# Patient Record
Sex: MALE | Race: OTHER | HISPANIC OR LATINO | Employment: FULL TIME | URBAN - METROPOLITAN AREA
[De-identification: names, ages, dates, MRNs, and addresses within clinical notes are randomized per-mention and may not be internally consistent; named-entity substitution may affect disease eponyms.]

---

## 2023-09-29 ENCOUNTER — HOSPITAL ENCOUNTER (OUTPATIENT)
Facility: CLINIC | Age: 38
Setting detail: OUTPATIENT SURGERY
End: 2023-09-29
Attending: STUDENT IN AN ORGANIZED HEALTH CARE EDUCATION/TRAINING PROGRAM | Admitting: STUDENT IN AN ORGANIZED HEALTH CARE EDUCATION/TRAINING PROGRAM
Payer: COMMERCIAL

## 2023-10-03 PROBLEM — H90.41 SENSORINEURAL HEARING LOSS (SNHL) OF RIGHT EAR WITH UNRESTRICTED HEARING OF LEFT EAR: Status: ACTIVE | Noted: 2023-10-03

## 2023-10-03 PROBLEM — L02.411 ABSCESS OF AXILLA, RIGHT: Status: ACTIVE | Noted: 2023-10-03

## 2023-10-03 RX ORDER — METHIMAZOLE 10 MG/1
10 TABLET ORAL
COMMUNITY

## 2023-10-03 RX ORDER — OMEPRAZOLE 20 MG/1
1 CAPSULE, DELAYED RELEASE ORAL
COMMUNITY
End: 2024-05-28 | Stop reason: SDUPTHER

## 2023-10-04 ENCOUNTER — CLINICAL SUPPORT (OUTPATIENT)
Dept: AUDIOLOGY | Facility: CLINIC | Age: 38
End: 2023-10-04
Payer: COMMERCIAL

## 2023-10-04 DIAGNOSIS — H90.41 SENSORINEURAL HEARING LOSS (SNHL) OF RIGHT EAR WITH UNRESTRICTED HEARING OF LEFT EAR: Primary | ICD-10-CM

## 2023-10-04 RX ORDER — CEFAZOLIN SODIUM 2 G/100ML
2 INJECTION, SOLUTION INTRAVENOUS ONCE
Status: CANCELLED | OUTPATIENT
Start: 2023-10-04 | End: 2023-10-04

## 2023-10-04 RX ORDER — SODIUM CHLORIDE, SODIUM LACTATE, POTASSIUM CHLORIDE, CALCIUM CHLORIDE 600; 310; 30; 20 MG/100ML; MG/100ML; MG/100ML; MG/100ML
100 INJECTION, SOLUTION INTRAVENOUS CONTINUOUS
Status: CANCELLED | OUTPATIENT
Start: 2023-10-04

## 2023-10-04 NOTE — PROGRESS NOTES
Device Selection:    Vaibhav, 38 years old, was seen for a device selection at the referral of Dr. Preston. He had a sudden sensorineural hearing loss in the right ear in May 2023. He was seen by an outside ENT in June 2023 and put on a steroid pack and intra-tympanic membrane injections. His hearing did not resolve.     He is a cochlear implant candidate in the right ear.    Internal Device: /[id number]  Surgeon: Remigio Preston  Surgery Date: 1/17/2024  Processor:  Kanso 2 in black  Audiologist: Yamel Perea  Activation Date: TBD  Ear: Right    Accessories: mini, microphone, TV streamer, activity kit, plus one    Hazel Schafer CCC-A  Licensed Audiologist

## 2024-05-28 ENCOUNTER — OFFICE VISIT (OUTPATIENT)
Dept: OTOLARYNGOLOGY | Facility: CLINIC | Age: 39
End: 2024-05-28
Payer: COMMERCIAL

## 2024-05-28 ENCOUNTER — HOSPITAL ENCOUNTER (OUTPATIENT)
Facility: CLINIC | Age: 39
Setting detail: OUTPATIENT SURGERY
End: 2024-05-28
Attending: STUDENT IN AN ORGANIZED HEALTH CARE EDUCATION/TRAINING PROGRAM | Admitting: STUDENT IN AN ORGANIZED HEALTH CARE EDUCATION/TRAINING PROGRAM
Payer: COMMERCIAL

## 2024-05-28 VITALS — HEIGHT: 67 IN | BODY MASS INDEX: 21.97 KG/M2 | WEIGHT: 140 LBS

## 2024-05-28 DIAGNOSIS — Z01.818 PRE-OP TESTING: ICD-10-CM

## 2024-05-28 DIAGNOSIS — Z23 NEED FOR VARICELLA VACCINE: ICD-10-CM

## 2024-05-28 DIAGNOSIS — Z23 NEED FOR MMR VACCINE: ICD-10-CM

## 2024-05-28 DIAGNOSIS — Z92.29: ICD-10-CM

## 2024-05-28 DIAGNOSIS — H90.41 SENSORINEURAL HEARING LOSS (SNHL) OF RIGHT EAR WITH UNRESTRICTED HEARING OF LEFT EAR: ICD-10-CM

## 2024-05-28 PROBLEM — I48.92 ATRIAL FLUTTER (MULTI): Status: ACTIVE | Noted: 2019-05-20

## 2024-05-28 PROBLEM — N40.1 BPH WITH OBSTRUCTION/LOWER URINARY TRACT SYMPTOMS: Status: ACTIVE | Noted: 2024-03-05

## 2024-05-28 PROBLEM — E05.90 THYROTOXICOSIS: Status: ACTIVE | Noted: 2019-05-20

## 2024-05-28 PROBLEM — R00.0 TACHYCARDIA: Status: ACTIVE | Noted: 2019-05-20

## 2024-05-28 PROBLEM — R35.1 NOCTURIA: Status: ACTIVE | Noted: 2024-03-05

## 2024-05-28 PROBLEM — E05.90 HYPERTHYROIDISM: Status: ACTIVE | Noted: 2019-05-20

## 2024-05-28 PROBLEM — N40.0 BPH WITHOUT URINARY OBSTRUCTION: Status: ACTIVE | Noted: 2024-04-05

## 2024-05-28 PROBLEM — R74.8 ELEVATED ALKALINE PHOSPHATASE LEVEL: Status: ACTIVE | Noted: 2019-04-09

## 2024-05-28 PROBLEM — R35.0 FREQUENCY OF MICTURITION: Status: ACTIVE | Noted: 2024-03-05

## 2024-05-28 PROBLEM — R73.9 HYPERGLYCEMIA: Status: ACTIVE | Noted: 2019-04-09

## 2024-05-28 PROBLEM — N13.8 BPH WITH OBSTRUCTION/LOWER URINARY TRACT SYMPTOMS: Status: ACTIVE | Noted: 2024-03-05

## 2024-05-28 PROBLEM — R39.11 HESITANCY: Status: ACTIVE | Noted: 2024-04-05

## 2024-05-28 PROCEDURE — 1036F TOBACCO NON-USER: CPT | Performed by: STUDENT IN AN ORGANIZED HEALTH CARE EDUCATION/TRAINING PROGRAM

## 2024-05-28 PROCEDURE — 99213 OFFICE O/P EST LOW 20 MIN: CPT | Performed by: STUDENT IN AN ORGANIZED HEALTH CARE EDUCATION/TRAINING PROGRAM

## 2024-05-28 RX ORDER — OMEPRAZOLE 20 MG/1
CAPSULE, DELAYED RELEASE ORAL
Qty: 16 CAPSULE | Refills: 0 | Status: SHIPPED | OUTPATIENT
Start: 2024-06-16

## 2024-05-28 RX ORDER — PREDNISONE 10 MG/1
TABLET ORAL DAILY
Qty: 66 TABLET | Refills: 0 | Status: SHIPPED | OUTPATIENT
Start: 2024-06-16 | End: 2024-07-02

## 2024-05-28 RX ORDER — ACETAMINOPHEN 500 MG
2000 TABLET ORAL DAILY
COMMUNITY

## 2024-05-28 NOTE — PROGRESS NOTES
"Chief Complaint     Right sided hearing loss      History of Present IllnessThis visit was conducted over the phone, and as such, physical exam was deferred. I was unable to complete the visit virtually due to connection issues, though I was able to briefly see him over the video, and he appeared to be in a similar physical state as his previous in person visit.     History of present illness from 8/17/2023:  Mr. Caban is a 38-year-old man who developed sudden hearing loss and vertigo in late May. He underwent oral steroid treatment as well as three intratympanic steroid injections with Clytemnestra Fedak. His balance is significantly better, but he continues to have significant difficulties with his hearing. He reports that he also has significant tinnitus on the right side. He also had some small amount of improvement in the right side, but continues to have significant hearing loss in that right side. He denies previous ear tubes or ear surgeries.     Interval History from 9/21/2023: He underwent cochlear implant evaluation and was found to be a candidate for right-sided cochlear implant. He is interested in right-sided cochlear implantation.     Interval History: He was previously set up for cochlear implantation on the right side, but cancelled his surgery as he wanted more time to consider.  He has considered his options, and would like to proceed with cochlear implantation.      PHYSICAL EXAM:    VITALS:   Vitals:    05/28/24 0821   Weight: 63.5 kg (140 lb)   Height: 1.702 m (5' 7\")        GENERAL: healthy, alert, well developed, well nourished, no distress, cooperative, appears chronologic age    Communicates with normal voice and without hearing aids.    HEAD: atraumatic, normocephalic, no lesions    EYES: normal, PERRLA and EOM's intact    EARS:   Right ear demonstrates a patent external auditory canal with a normal tympanic membrane.    Left ear: demonstrates a patent external auditory canal with a " normal tympanic membrane.   Alvarado tuning fork test lateralizes midline 512 Hz.   Rinne testing with a 512 Hz tuning fork: Right Air conduction > Bone conduction   Left Air conduction > Bone conduction      NOSE: nose shows no deformity, asymmetry, or inflammation, nasal mucosa normal,     ORAL CAVITY/OROPHARYNX: negative findings: lips normal without lesions, buccal mucosa normal, gums healthy, teeth intact, non-carious, palate normal, tongue midline and normal, soft palate, uvula, and tonsils normal    NECK AND SALIVARY GLANDS: Neck is supple without masses or lymphadenopathy. Palpation of the parotid and submandibular gland reveals no masses or tenderness to palpation.    CRANIAL NERVES: intact,   Facial nerve exam  is House - Brackmann 1- Normal Function on the right and 1- Normal Function on the left.    CARDIOVASCULAR: No evidence of peripheral edema    PULMONARY: normal lung excursion, no evidence of retractions    Diagnostic testing:  I reviewed the audiogram from 6/14/2023, which demonstrates left mild low-frequency hearing loss rising to normal hearing and right moderate rising to normal sensorineural hearing loss. Word recognition score was 96% on the left, and 8% on the right.     I reviewed the audiogram from 7/10/2023, which demonstrates normal left hearing and right severe rising to normal sensorineural hearing loss. Word recognition score on the left was 100% and 20% on the right.     I was able to review the images and the report from his MRI brain with and without contrast. There is no evidence of retrocochlear pathology.     I reviewed the cochlear implant evaluation, which demonstrated the following:  Right Ear:   CNC in quiet: -- 40%  AzBio in quiet: --56%  AzBio +10 SNR: -- 55%     Impression:  Right sided sensorineural hearing loss from a sudden cause. Likely caused by viral labyrinthitis.     Recommendation:  I discussed the patient's hearing loss. I discussed that his hearing in the right  side is likely not serviceable based on a word recognition score of 20%. I discussed options for hearing rehabilitation, which include CROS hearing aids, bone-conduction devices, and cochlear implantation. I discussed the risk, benefits of cochlear implantation, which include benefits of sound localization, hearing and noise, and tinnitus reduction with risks of poor performance, loss of natural hearing, dizziness, change in taste, facial weakness, tympanic membrane perforation, cerebrospinal fluid leak, implant infection, implant malfunction, implant extrusion, meningitis, need for further procedures. I also discussed vaccination. He also needs prevnar 20. He would like to proceed with cochlear implantation. I recommend a prednisone taper with omeprazole for possible hearing preservation. I would also like to have ECOG at the time of surgery. I recommend cochlear brand 632 with 622 backup electrode.  The patient is interested in proceeding.       This note was created using speech recognition transcription software. Despite proofreading, several typographical errors might be present that might affect the meaning of the content. Please call with any questions.

## 2024-06-03 NOTE — PREPROCEDURE INSTRUCTIONS
Pre-Op Instructions & Checklist   Your surgery has been scheduled at [facility] at [address] [place], [address], [address], in the [facility]. Parking is to the left of the main entrance.  You will be contacted about the time of your surgery the day before your surgery. If you are unable to answer the phone, a detailed voicemail message will be left. Make sure that your voicemail box is not full so a message can be left. If you have not received a call by 3:00 pm you may call [phone] between the hours of 3:00 and 4:00 pm. Please be available by phone the night before/day of surgery in case there is a change in the schedule which may require you to arrive earlier/later.    14 DAYS BEFORE SURGERY STOP TAKING WEIGHT LOSS MEDICATIONS      7 DAYS BEFORE SURGERY STOP THESE MEDICATIONS:  Multiple Vitamins containing Vitamin E  Herbal supplements, Fish Oil, garlic pills, turmeric, CoQ enzyme  Stop taking aspirin, and aspirin-containing products as well as NSAID's such as Advil, Motrin, Aleve, Ibuprofen. Tylenol is okay to take for pain relief.   If you are currently taking Coumadin/Warfarin, we will have to coordinate that with your PCP &/or the Anticoagulation Clinic.    THE DAY BEFORE SURGERY:  Do not eat any food after midnight the night before surgery.   You are permitted to have clear liquids such as water, apple juice, plain tea or coffee (no milk or creamer), clear electrolyte-replenishing drinks such as Pedialyte, Gatorade, or Powerade (not yogurt or pulp-containing smoothies or juices such as orange juice) up to 2 hours before your surgery.    DAY OF SURGERY, TAKE THESE MEDICATIONS with a small sip of water (if it is not listed, do not take it):    Take: Methimazole; prednisone; omeprazole                  ON THE MORNING OF SURGERY:  *Shower either the night before your surgery or the morning of your surgery  *Do not use moisturizers, creams, lotions or perfume, or  make-up.  *Wear comfortable, loose fitting clothing.   *All jewelry and valuables should be left at home.  *Prosthetic devices such as contact lenses, hearing aids, dentures, eyelash extensions, hairpins and body piercings must be removed before surgery. Bring containers for eyeglasses/contacts, dentures, or hearing aids with you.  Diabetics: Please check fasting blood sugars upon waking up.  If fasting blood sugars are <80ml/dl, please drink 100ml/3oz. of apple juice no later than 2 hours prior to surgery.      BRING WITH YOU:   *Photo ID and insurance card  *Current list of medicines and allergies  *Pacemaker/Defibrillator/Heart stent cards  *Copy of your complete Advanced Directive/DHPOA-if applicable     SMOKING:  *Quitting smoking can make a huge difference to your health and recovery from surgery.    *If you need help with quitting, call 4-055-QUIT-NOW.  Alcohol:  *No alcoholic beverages for 48 hours before surgery.     AFTER OUTPATIENT SURGERY:  *A responsible adult MUST accompany you at the time of discharge and stay with you for 24 hours after your surgery.  *You may NOT drive yourself home after surgery.  *You may use a taxi or ride sharing service ([employer], Uber) to return home ONLY if you are to change the date accompanied by a friend or family member.  *Instructions for resuming your medications will be provided by your surgeon.     CONTACT SURGEON'S OFFICE IF YOU DEVELOP:  * Fever =/> 100.4 F   * New respiratory symptoms (e.g. cough, shortness of breath, respiratory distress, sore throat)  * Recent loss of taste or smell  *Flu like symptoms such as headache, fatigue or gastrointestinal symptoms  * If you develop any open sores, shingles, burning or painful urination   AND/OR:  * You no longer wish to have the surgery.  * Any other personal circumstances change that may lead to the need to cancel or defer this surgery.  *You were admitted to any hospital within one week of your planned procedure.     If  you have any questions regarding these preoperative instructions you may call [phone]. If you have questions regarding you surgical procedure, or post-operative care/recovery please call your surgeon's office.

## 2024-06-03 NOTE — CPM/PAT H&P
CPM/PAT Evaluation       Name: Vaibhav Caban (Vaibhav Caban)  /Age: 1985/38 y.o.     TELEMEDICINE ENCOUNTER  Patient was contacted by telephone for preadmission testing perioperative risk assessment prior to surgery.    CHIEF COMPLAINT  Sensorineural hearing loss, right ear    HPI  Owen Bañuelos is a 38-year-old male experienced sudden hearing loss and vertigo in May 2023.  He was treated with oral steroids as well as 3 intratympanic steroid injections.  He states that the vertigo/balance has improved, but he continues to have significant right-sided hearing loss.  He also is complaining of tinnitus on the right side.  He denies any exposure to ototoxic drugs, prior ear surgery or ear tube placement.  He is interested in cochlear implant, and underwent cochlear implant evaluation and was found to be a candidate.  He is scheduled for right-sided cochlear implantation on 2024.    ACTIVE PROBLEMS  Patient Active Problem List   Diagnosis    Abscess of axilla, right    Sensorineural hearing loss (SNHL) of right ear with unrestricted hearing of left ear    Atrial flutter (Multi)    BPH with obstruction/lower urinary tract symptoms    BPH without urinary obstruction    Elevated alkaline phosphatase level    Frequency of micturition    Hesitancy    Hyperglycemia    Nocturia    Tachycardia    Hyperthyroidism    Thyrotoxicosis     PAST MEDICAL HISTORY  Past Medical History:   Diagnosis Date    Disease of thyroid gland On 2020    Tinnitus May 2023     SURGICAL HISTORY  Past Surgical History:   Procedure Laterality Date    BUNIONECTOMY      COLONOSCOPY      FOOT SURGERY       ANESTHESIA HISTORY  Denies problems with anesthesia in the past such as PONV, prolonged sedation, awareness, dental damage, aspiration, cardiac arrest, difficult intubation, or unexpected hospital admissions.  Denies family history of malignant hyperthermia, or pseudocholinesterase deficiency.    SOCIAL HISTORY  Never smoker; EtOH:  Occasional use; denies recreational drug use.  Patient states he exercises 5-6 times a week and is able to do moderate ADLs such as heavy housework, light yard work.  He denies chest pain, CORONA.  METS 4    FAMILY HISTORY  Family History   Family history unknown: Yes     ALLERGIES  Not on File    MEDICATIONS  No current facility-administered medications for this encounter.    Current Outpatient Medications:     cholecalciferol (Vitamin D-3) 50 mcg (2,000 unit) capsule, Take 1 capsule (50 mcg) by mouth early in the morning.., Disp: , Rfl:     methIMAzole (Tapazole) 10 mg tablet, Take 1 tablet (10 mg) by mouth., Disp: , Rfl:     [START ON 6/16/2024] omeprazole (PriLOSEC) 20 mg DR capsule, TAKE ONE CAPSULE EVERY MORNING BEFORE BREAKFAST WHILE TAKING STEROID TAPER Do not fill before June 16, 2024., Disp: 16 capsule, Rfl: 0    [START ON 6/16/2024] predniSONE (Deltasone) 10 mg tablet, Take 6 tablets (60 mg) by mouth once daily for 6 days, THEN 5 tablets (50 mg) once daily for 2 days, THEN 4 tablets (40 mg) once daily for 2 days, THEN 3 tablets (30 mg) once daily for 2 days, THEN 2 tablets (20 mg) once daily for 2 days, THEN 1 tablet (10 mg) once daily for 2 days. START TAPER 3 DAYS PRIOR TO SURGERY. TAKE PRILOSEC WITH STEROID. Do not fill before June 16, 2024., Disp: 66 tablet, Rfl: 0    Review of Systems   HENT:  Positive for hearing loss (Right-sided).    All other systems reviewed and are negative.    PHYSICAL EXAM  Deferred    AIRWAY EXAM  Deferred    VITALS  No vitals taken for telemedicine visit  Height: 5 feet 7 inches; weight: 140 pounds; BMI: 21.93  BP Readings from Last 4 Encounters:   08/17/23 119/71     LABS  Lab orders for CBC, CMP, type and screen placed by Dr. Preston on 05/28/2024      IMAGING  Echocardiogram from 12/22/2020  Interpretation summary: Normal LV size; left ventricular systolic function is normal with estimated ejection fraction 55%      EKG order placed on 05/28/2024 by   Kary.      ASSESSMENT/PLAN  Right-sided sensorineural hearing loss  Right cochlear implant    This note was created in part upon personal review of patient's medical records.  Speech recognition transcription software was used in the creation of this note. Despite proofreading, several typographical errors might be present that might affect the meaning of the content.

## 2024-06-07 DIAGNOSIS — Z92.29: ICD-10-CM

## 2024-06-07 RX ORDER — PNEUMOCOCCAL 20-VALENT CONJUGATE VACCINE 2.2; 2.2; 2.2; 2.2; 2.2; 2.2; 2.2; 2.2; 2.2; 2.2; 2.2; 2.2; 2.2; 2.2; 2.2; 2.2; 4.4; 2.2; 2.2; 2.2 UG/.5ML; UG/.5ML; UG/.5ML; UG/.5ML; UG/.5ML; UG/.5ML; UG/.5ML; UG/.5ML; UG/.5ML; UG/.5ML; UG/.5ML; UG/.5ML; UG/.5ML; UG/.5ML; UG/.5ML; UG/.5ML; UG/.5ML; UG/.5ML; UG/.5ML; UG/.5ML
0.5 INJECTION, SUSPENSION INTRAMUSCULAR ONCE
Qty: 0.5 ML | Refills: 0 | Status: SHIPPED | OUTPATIENT
Start: 2024-06-07 | End: 2024-06-07

## 2024-06-13 ENCOUNTER — TELEPHONE (OUTPATIENT)
Dept: OTOLARYNGOLOGY | Facility: CLINIC | Age: 39
End: 2024-06-13
Payer: COMMERCIAL

## 2024-06-13 NOTE — TELEPHONE ENCOUNTER
Patient called to inquire about how to schedule an appointment for EKG and labs. Left voice message to notify the patient that no appointment is necessary and that the orders are in the system. Provided contact information for central scheduling for assistance with locations.

## 2024-06-25 ENCOUNTER — TELEPHONE (OUTPATIENT)
Dept: OTOLARYNGOLOGY | Facility: CLINIC | Age: 39
End: 2024-06-25

## 2024-06-25 ENCOUNTER — APPOINTMENT (OUTPATIENT)
Dept: OTOLARYNGOLOGY | Facility: CLINIC | Age: 39
End: 2024-06-25
Payer: COMMERCIAL

## 2024-06-25 NOTE — TELEPHONE ENCOUNTER
Contacted patient to discuss available surgery date, 7/17 at the [facility]. Voice message left; will await callback with confirmation.

## 2024-06-27 DIAGNOSIS — H90.41 SENSORINEURAL HEARING LOSS (SNHL) OF RIGHT EAR WITH UNRESTRICTED HEARING OF LEFT EAR: ICD-10-CM

## 2024-07-03 DIAGNOSIS — H90.41 SENSORINEURAL HEARING LOSS (SNHL) OF RIGHT EAR WITH UNRESTRICTED HEARING OF LEFT EAR: ICD-10-CM

## 2024-07-09 ENCOUNTER — APPOINTMENT (OUTPATIENT)
Dept: AUDIOLOGY | Facility: CLINIC | Age: 39
End: 2024-07-09
Payer: COMMERCIAL

## 2024-07-09 DIAGNOSIS — H90.41 SENSORINEURAL HEARING LOSS (SNHL) OF RIGHT EAR WITH UNRESTRICTED HEARING OF LEFT EAR: ICD-10-CM

## 2024-07-09 RX ORDER — OMEPRAZOLE 20 MG/1
CAPSULE, DELAYED RELEASE ORAL
Qty: 16 CAPSULE | Refills: 0 | Status: SHIPPED | OUTPATIENT
Start: 2024-07-14

## 2024-07-09 RX ORDER — PREDNISONE 10 MG/1
TABLET ORAL DAILY
Qty: 66 TABLET | Refills: 0 | Status: SHIPPED | OUTPATIENT
Start: 2024-07-14 | End: 2024-07-30

## 2024-07-11 ENCOUNTER — LAB (OUTPATIENT)
Dept: LAB | Facility: LAB | Age: 39
End: 2024-07-11
Payer: COMMERCIAL

## 2024-07-11 ENCOUNTER — HOSPITAL ENCOUNTER (OUTPATIENT)
Dept: CARDIOLOGY | Facility: CLINIC | Age: 39
Discharge: HOME | End: 2024-07-11
Payer: COMMERCIAL

## 2024-07-11 DIAGNOSIS — H90.41 SENSORINEURAL HEARING LOSS (SNHL) OF RIGHT EAR WITH UNRESTRICTED HEARING OF LEFT EAR: ICD-10-CM

## 2024-07-11 LAB
ABO GROUP (TYPE) IN BLOOD: NORMAL
ANION GAP SERPL CALC-SCNC: 12 MMOL/L (ref 10–20)
ANTIBODY SCREEN: NORMAL
BUN SERPL-MCNC: 20 MG/DL (ref 6–23)
CALCIUM SERPL-MCNC: 9.5 MG/DL (ref 8.6–10.6)
CHLORIDE SERPL-SCNC: 103 MMOL/L (ref 98–107)
CO2 SERPL-SCNC: 27 MMOL/L (ref 21–32)
CREAT SERPL-MCNC: 1.2 MG/DL (ref 0.5–1.3)
EGFRCR SERPLBLD CKD-EPI 2021: 78 ML/MIN/1.73M*2
ERYTHROCYTE [DISTWIDTH] IN BLOOD BY AUTOMATED COUNT: 12.6 % (ref 11.5–14.5)
GLUCOSE SERPL-MCNC: 95 MG/DL (ref 74–99)
HCT VFR BLD AUTO: 41 % (ref 41–52)
HGB BLD-MCNC: 13.7 G/DL (ref 13.5–17.5)
MCH RBC QN AUTO: 29 PG (ref 26–34)
MCHC RBC AUTO-ENTMCNC: 33.4 G/DL (ref 32–36)
MCV RBC AUTO: 87 FL (ref 80–100)
NRBC BLD-RTO: 0 /100 WBCS (ref 0–0)
PLATELET # BLD AUTO: 270 X10*3/UL (ref 150–450)
POTASSIUM SERPL-SCNC: 4.4 MMOL/L (ref 3.5–5.3)
RBC # BLD AUTO: 4.72 X10*6/UL (ref 4.5–5.9)
RH FACTOR (ANTIGEN D): NORMAL
SODIUM SERPL-SCNC: 138 MMOL/L (ref 136–145)
WBC # BLD AUTO: 4.4 X10*3/UL (ref 4.4–11.3)

## 2024-07-11 PROCEDURE — 93005 ELECTROCARDIOGRAM TRACING: CPT

## 2024-07-11 PROCEDURE — 36415 COLL VENOUS BLD VENIPUNCTURE: CPT

## 2024-07-11 PROCEDURE — 86901 BLOOD TYPING SEROLOGIC RH(D): CPT

## 2024-07-11 PROCEDURE — 86900 BLOOD TYPING SEROLOGIC ABO: CPT

## 2024-07-11 PROCEDURE — 86850 RBC ANTIBODY SCREEN: CPT

## 2024-07-11 PROCEDURE — 85027 COMPLETE CBC AUTOMATED: CPT

## 2024-07-11 PROCEDURE — 80048 BASIC METABOLIC PNL TOTAL CA: CPT

## 2024-07-13 LAB
ATRIAL RATE: 62 BPM
P AXIS: 30 DEGREES
P OFFSET: 209 MS
P ONSET: 155 MS
PR INTERVAL: 124 MS
Q ONSET: 217 MS
QRS COUNT: 10 BEATS
QRS DURATION: 94 MS
QT INTERVAL: 410 MS
QTC CALCULATION(BAZETT): 416 MS
QTC FREDERICIA: 414 MS
R AXIS: 69 DEGREES
T AXIS: 60 DEGREES
T OFFSET: 422 MS
VENTRICULAR RATE: 62 BPM

## 2024-07-16 ENCOUNTER — ANESTHESIA EVENT (OUTPATIENT)
Dept: OPERATING ROOM | Facility: CLINIC | Age: 39
End: 2024-07-16
Payer: COMMERCIAL

## 2024-07-17 ENCOUNTER — HOSPITAL ENCOUNTER (OUTPATIENT)
Facility: CLINIC | Age: 39
Setting detail: OUTPATIENT SURGERY
Discharge: HOME | End: 2024-07-17
Attending: STUDENT IN AN ORGANIZED HEALTH CARE EDUCATION/TRAINING PROGRAM | Admitting: STUDENT IN AN ORGANIZED HEALTH CARE EDUCATION/TRAINING PROGRAM
Payer: COMMERCIAL

## 2024-07-17 ENCOUNTER — ANESTHESIA (OUTPATIENT)
Dept: OPERATING ROOM | Facility: CLINIC | Age: 39
End: 2024-07-17
Payer: COMMERCIAL

## 2024-07-17 VITALS
HEART RATE: 64 BPM | OXYGEN SATURATION: 99 % | RESPIRATION RATE: 16 BRPM | TEMPERATURE: 97.5 F | DIASTOLIC BLOOD PRESSURE: 83 MMHG | SYSTOLIC BLOOD PRESSURE: 126 MMHG | BODY MASS INDEX: 22.49 KG/M2 | WEIGHT: 143.3 LBS | HEIGHT: 67 IN

## 2024-07-17 DIAGNOSIS — H90.41 SENSORINEURAL HEARING LOSS (SNHL) OF RIGHT EAR WITH UNRESTRICTED HEARING OF LEFT EAR: Primary | ICD-10-CM

## 2024-07-17 DIAGNOSIS — G89.18 POSTOPERATIVE PAIN: ICD-10-CM

## 2024-07-17 PROCEDURE — A69930 PR IMPLANT COCHLEAR DEVICE

## 2024-07-17 PROCEDURE — 7100000001 HC RECOVERY ROOM TIME - INITIAL BASE CHARGE: Performed by: STUDENT IN AN ORGANIZED HEALTH CARE EDUCATION/TRAINING PROGRAM

## 2024-07-17 PROCEDURE — 3600000004 HC OR TIME - INITIAL BASE CHARGE - PROCEDURE LEVEL FOUR: Performed by: STUDENT IN AN ORGANIZED HEALTH CARE EDUCATION/TRAINING PROGRAM

## 2024-07-17 PROCEDURE — 3700000001 HC GENERAL ANESTHESIA TIME - INITIAL BASE CHARGE: Performed by: STUDENT IN AN ORGANIZED HEALTH CARE EDUCATION/TRAINING PROGRAM

## 2024-07-17 PROCEDURE — 7100000010 HC PHASE TWO TIME - EACH INCREMENTAL 1 MINUTE: Performed by: STUDENT IN AN ORGANIZED HEALTH CARE EDUCATION/TRAINING PROGRAM

## 2024-07-17 PROCEDURE — 2500000004 HC RX 250 GENERAL PHARMACY W/ HCPCS (ALT 636 FOR OP/ED)

## 2024-07-17 PROCEDURE — L8614 COCHLEAR DEVICE: HCPCS | Performed by: STUDENT IN AN ORGANIZED HEALTH CARE EDUCATION/TRAINING PROGRAM

## 2024-07-17 PROCEDURE — 69930 IMPLANT COCHLEAR DEVICE: CPT | Performed by: STUDENT IN AN ORGANIZED HEALTH CARE EDUCATION/TRAINING PROGRAM

## 2024-07-17 PROCEDURE — A69930 PR IMPLANT COCHLEAR DEVICE: Performed by: ANESTHESIOLOGY

## 2024-07-17 PROCEDURE — 3600000009 HC OR TIME - EACH INCREMENTAL 1 MINUTE - PROCEDURE LEVEL FOUR: Performed by: STUDENT IN AN ORGANIZED HEALTH CARE EDUCATION/TRAINING PROGRAM

## 2024-07-17 PROCEDURE — 2500000005 HC RX 250 GENERAL PHARMACY W/O HCPCS

## 2024-07-17 PROCEDURE — 7100000009 HC PHASE TWO TIME - INITIAL BASE CHARGE: Performed by: STUDENT IN AN ORGANIZED HEALTH CARE EDUCATION/TRAINING PROGRAM

## 2024-07-17 PROCEDURE — 7100000002 HC RECOVERY ROOM TIME - EACH INCREMENTAL 1 MINUTE: Performed by: STUDENT IN AN ORGANIZED HEALTH CARE EDUCATION/TRAINING PROGRAM

## 2024-07-17 PROCEDURE — 2500000001 HC RX 250 WO HCPCS SELF ADMINISTERED DRUGS (ALT 637 FOR MEDICARE OP): Performed by: ANESTHESIOLOGY

## 2024-07-17 PROCEDURE — 2500000004 HC RX 250 GENERAL PHARMACY W/ HCPCS (ALT 636 FOR OP/ED): Performed by: STUDENT IN AN ORGANIZED HEALTH CARE EDUCATION/TRAINING PROGRAM

## 2024-07-17 PROCEDURE — 2780000003 HC OR 278 NO HCPCS: Performed by: STUDENT IN AN ORGANIZED HEALTH CARE EDUCATION/TRAINING PROGRAM

## 2024-07-17 PROCEDURE — 2500000005 HC RX 250 GENERAL PHARMACY W/O HCPCS: Performed by: STUDENT IN AN ORGANIZED HEALTH CARE EDUCATION/TRAINING PROGRAM

## 2024-07-17 PROCEDURE — 2720000007 HC OR 272 NO HCPCS: Performed by: STUDENT IN AN ORGANIZED HEALTH CARE EDUCATION/TRAINING PROGRAM

## 2024-07-17 PROCEDURE — 3700000002 HC GENERAL ANESTHESIA TIME - EACH INCREMENTAL 1 MINUTE: Performed by: STUDENT IN AN ORGANIZED HEALTH CARE EDUCATION/TRAINING PROGRAM

## 2024-07-17 DEVICE — IMPLANTABLE DEVICE
Type: IMPLANTABLE DEVICE | Site: EAR | Status: FUNCTIONAL
Brand: COCHLEAR™ NUCLEUS® CI622 COCHLEAR IMPLANT WITH SLIM STRAIGHT ELECTRODE

## 2024-07-17 RX ORDER — FENTANYL CITRATE 50 UG/ML
INJECTION, SOLUTION INTRAMUSCULAR; INTRAVENOUS AS NEEDED
Status: DISCONTINUED | OUTPATIENT
Start: 2024-07-17 | End: 2024-07-17

## 2024-07-17 RX ORDER — PROPOFOL 10 MG/ML
INJECTION, EMULSION INTRAVENOUS CONTINUOUS PRN
Status: DISCONTINUED | OUTPATIENT
Start: 2024-07-17 | End: 2024-07-17

## 2024-07-17 RX ORDER — CEFAZOLIN 1 G/1
INJECTION, POWDER, FOR SOLUTION INTRAVENOUS AS NEEDED
Status: DISCONTINUED | OUTPATIENT
Start: 2024-07-17 | End: 2024-07-17

## 2024-07-17 RX ORDER — MIDAZOLAM HYDROCHLORIDE 1 MG/ML
INJECTION, SOLUTION INTRAMUSCULAR; INTRAVENOUS AS NEEDED
Status: DISCONTINUED | OUTPATIENT
Start: 2024-07-17 | End: 2024-07-17

## 2024-07-17 RX ORDER — FENTANYL CITRATE 50 UG/ML
50 INJECTION, SOLUTION INTRAMUSCULAR; INTRAVENOUS EVERY 5 MIN PRN
Status: DISCONTINUED | OUTPATIENT
Start: 2024-07-17 | End: 2024-07-17 | Stop reason: HOSPADM

## 2024-07-17 RX ORDER — DOCUSATE SODIUM 100 MG/1
100 CAPSULE, LIQUID FILLED ORAL 2 TIMES DAILY
Qty: 28 CAPSULE | Refills: 0 | Status: SHIPPED | OUTPATIENT
Start: 2024-07-17 | End: 2024-07-31

## 2024-07-17 RX ORDER — DEXAMETHASONE SODIUM PHOSPHATE 10 MG/ML
INJECTION INTRAMUSCULAR; INTRAVENOUS AS NEEDED
Status: DISCONTINUED | OUTPATIENT
Start: 2024-07-17 | End: 2024-07-17 | Stop reason: HOSPADM

## 2024-07-17 RX ORDER — POLYMYXIN B 500000 [USP'U]/1
INJECTION, POWDER, LYOPHILIZED, FOR SOLUTION INTRAMUSCULAR; INTRATHECAL; INTRAVENOUS; OPHTHALMIC AS NEEDED
Status: DISCONTINUED | OUTPATIENT
Start: 2024-07-17 | End: 2024-07-17 | Stop reason: HOSPADM

## 2024-07-17 RX ORDER — ACETAMINOPHEN 325 MG/1
650 TABLET ORAL EVERY 4 HOURS PRN
Status: DISCONTINUED | OUTPATIENT
Start: 2024-07-17 | End: 2024-07-17 | Stop reason: HOSPADM

## 2024-07-17 RX ORDER — LIDOCAINE IN NACL,ISO-OSMOT/PF 30 MG/3 ML
0.1 SYRINGE (ML) INJECTION ONCE
Status: DISCONTINUED | OUTPATIENT
Start: 2024-07-17 | End: 2024-07-17 | Stop reason: HOSPADM

## 2024-07-17 RX ORDER — FENTANYL CITRATE 50 UG/ML
25 INJECTION, SOLUTION INTRAMUSCULAR; INTRAVENOUS EVERY 5 MIN PRN
Status: DISCONTINUED | OUTPATIENT
Start: 2024-07-17 | End: 2024-07-17 | Stop reason: HOSPADM

## 2024-07-17 RX ORDER — SODIUM CHLORIDE, SODIUM LACTATE, POTASSIUM CHLORIDE, CALCIUM CHLORIDE 600; 310; 30; 20 MG/100ML; MG/100ML; MG/100ML; MG/100ML
INJECTION, SOLUTION INTRAVENOUS CONTINUOUS PRN
Status: DISCONTINUED | OUTPATIENT
Start: 2024-07-17 | End: 2024-07-17

## 2024-07-17 RX ORDER — LIDOCAINE HYDROCHLORIDE AND EPINEPHRINE 10; 10 MG/ML; UG/ML
INJECTION, SOLUTION INFILTRATION; PERINEURAL AS NEEDED
Status: DISCONTINUED | OUTPATIENT
Start: 2024-07-17 | End: 2024-07-17 | Stop reason: HOSPADM

## 2024-07-17 RX ORDER — ONDANSETRON HYDROCHLORIDE 2 MG/ML
4 INJECTION, SOLUTION INTRAVENOUS ONCE AS NEEDED
Status: DISCONTINUED | OUTPATIENT
Start: 2024-07-17 | End: 2024-07-17 | Stop reason: HOSPADM

## 2024-07-17 RX ORDER — LABETALOL HYDROCHLORIDE 5 MG/ML
5 INJECTION, SOLUTION INTRAVENOUS ONCE AS NEEDED
Status: DISCONTINUED | OUTPATIENT
Start: 2024-07-17 | End: 2024-07-17 | Stop reason: HOSPADM

## 2024-07-17 RX ORDER — METOCLOPRAMIDE HYDROCHLORIDE 5 MG/ML
10 INJECTION INTRAMUSCULAR; INTRAVENOUS ONCE AS NEEDED
Status: DISCONTINUED | OUTPATIENT
Start: 2024-07-17 | End: 2024-07-17 | Stop reason: HOSPADM

## 2024-07-17 RX ORDER — SODIUM CHLORIDE 0.9 G/100ML
IRRIGANT IRRIGATION AS NEEDED
Status: DISCONTINUED | OUTPATIENT
Start: 2024-07-17 | End: 2024-07-17 | Stop reason: HOSPADM

## 2024-07-17 RX ORDER — OXYCODONE HYDROCHLORIDE 5 MG/1
5 TABLET ORAL EVERY 4 HOURS PRN
Status: DISCONTINUED | OUTPATIENT
Start: 2024-07-17 | End: 2024-07-17 | Stop reason: HOSPADM

## 2024-07-17 RX ORDER — ONDANSETRON 4 MG/1
4 TABLET, ORALLY DISINTEGRATING ORAL EVERY 8 HOURS PRN
Qty: 20 TABLET | Refills: 0 | Status: SHIPPED | OUTPATIENT
Start: 2024-07-17 | End: 2024-07-24

## 2024-07-17 RX ORDER — ALBUTEROL SULFATE 0.83 MG/ML
2.5 SOLUTION RESPIRATORY (INHALATION) ONCE AS NEEDED
Status: DISCONTINUED | OUTPATIENT
Start: 2024-07-17 | End: 2024-07-17 | Stop reason: HOSPADM

## 2024-07-17 RX ORDER — ONDANSETRON HYDROCHLORIDE 2 MG/ML
INJECTION, SOLUTION INTRAVENOUS AS NEEDED
Status: DISCONTINUED | OUTPATIENT
Start: 2024-07-17 | End: 2024-07-17

## 2024-07-17 RX ORDER — TRAMADOL HYDROCHLORIDE 50 MG/1
50 TABLET ORAL EVERY 6 HOURS PRN
Qty: 12 TABLET | Refills: 0 | Status: SHIPPED | OUTPATIENT
Start: 2024-07-17 | End: 2024-07-20

## 2024-07-17 RX ORDER — SODIUM CHLORIDE, SODIUM LACTATE, POTASSIUM CHLORIDE, CALCIUM CHLORIDE 600; 310; 30; 20 MG/100ML; MG/100ML; MG/100ML; MG/100ML
100 INJECTION, SOLUTION INTRAVENOUS CONTINUOUS
Status: DISCONTINUED | OUTPATIENT
Start: 2024-07-17 | End: 2024-07-17 | Stop reason: HOSPADM

## 2024-07-17 RX ORDER — EPINEPHRINE 1 MG/ML
INJECTION, SOLUTION, CONCENTRATE INTRAVENOUS AS NEEDED
Status: DISCONTINUED | OUTPATIENT
Start: 2024-07-17 | End: 2024-07-17 | Stop reason: HOSPADM

## 2024-07-17 RX ORDER — LIDOCAINE HYDROCHLORIDE 20 MG/ML
INJECTION, SOLUTION INFILTRATION; PERINEURAL AS NEEDED
Status: DISCONTINUED | OUTPATIENT
Start: 2024-07-17 | End: 2024-07-17

## 2024-07-17 SDOH — HEALTH STABILITY: MENTAL HEALTH: CURRENT SMOKER: 0

## 2024-07-17 ASSESSMENT — PAIN SCALES - GENERAL
PAINLEVEL_OUTOF10: 0 - NO PAIN
PAINLEVEL_OUTOF10: 5 - MODERATE PAIN
PAINLEVEL_OUTOF10: 0 - NO PAIN
PAINLEVEL_OUTOF10: 4
PAIN_LEVEL: 0

## 2024-07-17 ASSESSMENT — COLUMBIA-SUICIDE SEVERITY RATING SCALE - C-SSRS
6. HAVE YOU EVER DONE ANYTHING, STARTED TO DO ANYTHING, OR PREPARED TO DO ANYTHING TO END YOUR LIFE?: NO
2. HAVE YOU ACTUALLY HAD ANY THOUGHTS OF KILLING YOURSELF?: NO
1. IN THE PAST MONTH, HAVE YOU WISHED YOU WERE DEAD OR WISHED YOU COULD GO TO SLEEP AND NOT WAKE UP?: NO

## 2024-07-17 ASSESSMENT — PAIN DESCRIPTION - ORIENTATION: ORIENTATION: RIGHT

## 2024-07-17 ASSESSMENT — PAIN DESCRIPTION - LOCATION: LOCATION: EAR

## 2024-07-17 ASSESSMENT — PAIN - FUNCTIONAL ASSESSMENT: PAIN_FUNCTIONAL_ASSESSMENT: 0-10

## 2024-07-17 NOTE — ANESTHESIA PROCEDURE NOTES
Airway  Date/Time: 7/17/2024 11:35 AM  Urgency: elective    Airway not difficult    Staffing  Performed: SRNA   Authorized by: Brady Galloway MD    Performed by: RAUL Vega  Patient location during procedure: OR    Indications and Patient Condition  Indications for airway management: anesthesia  Spontaneous Ventilation: absent  Sedation level: deep  Preoxygenated: yes  Patient position: sniffing  Mask difficulty assessment: 1 - vent by mask    Final Airway Details  Final airway type: supraglottic airway      Successful airway: Supraglottic airway: IGel.  Size 4     Number of attempts at approach: 1  Number of other approaches attempted: 0

## 2024-07-17 NOTE — DISCHARGE INSTRUCTIONS
Take the previously prescribed steroids and anti-reflux medications as prescribed.    Most ear surgeries should have a 2-4 week postoperative appointment. Please be sure to call the doctor's office and make a follow-up appointment, if you don't already have it.  Dressing or Band-Aid can be removed the day after surgery.  Once removed, replace the cotton ball in the ear as needed.  Once the dressing is off, and if you have an incision behind your ear with stitches, clean the incision twice daily with soap and water and apply Vaseline or antibiotic ointment after cleaning. If you have paper strips or surgical glue over the incision, Do not apply anything behind the ear.  Bloody drainage from the ear is common.  Call the office if discharge from the ear last longer than 21 days or develops an odor or color.  You may shower the day after surgery, if you keep your head dry.  The hair may be shampooed 2 days following surgery.  You may get water on incision or in ear canal, but do not actively scrub the incision.  Bloody discharge from incision area may occur during the first 10 days following surgery.  If this persists or increases, please call the office.  A full sensation with popping sounds may be noticed during the healing process.  DO NOT BLOW YOUR NOSE FOR THREE WEEKS FOLLOWING SURGERY. If you sneeze, do so with your mouth open for three weeks following surgery. Do not use a straw to drink beverages for 3 weeks following surgery.  Do not use Q-Tips or put anything in the canal, until approved by your doctor.  Do not be concerned regarding your hearing for a period of six to eight weeks following surgery.  Your hearing will be evaluated at this time; until then, your hearing may sound muffled and your voice may echo in your ear during speech.  Minor swelling of the face on the same side of the surgery is not uncommon.  Small bruising near the eye or mouth is not uncommon from the facial nerve  monitor.  Dizziness, ringing in the ear, and taste disturbance after surgery are common. Call if severe.   You might notice pain when chewing, please use soft diet for 2 weeks if you experience this.  No lifting (more than 10 lbs) or straining until follow up.  You will be discharged on pain medications and possibly antibiotics.  You may resume your routine medications as directed, unless you have been instructed otherwise by the prescribing healthcare provider.  Should you experience any difficulty upon returning home, or if you simply have questions, please contact us.  As your surgeons, we are most familiar with your operation and postoperative procedures.  We are accessible by telephone 24 hours a day, 7 days a week.  Once we have assessed your situation, we will be prepared to make specific suggestions for your care.

## 2024-07-17 NOTE — OP NOTE
Insertion Cochlear Implant (R) Operative Note     Date: 2024  [place] [facility] [place]    Name: Vaibhav Caban, : 1985, Age: 39 y.o., MRN: [id number], Sex: male    Diagnosis  Pre-op Diagnosis      * Sensorineural hearing loss (SNHL) of right ear with unrestricted hearing of left ear [H90.41] Post-op Diagnosis     * Sensorineural hearing loss (SNHL) of right ear with unrestricted hearing of left ear [H90.41]     Procedures  Insertion Cochlear Implant  12354 - ND COCHLEAR DEVICE IMPLANTATION W/WO MASTOIDECTOMY    ND COCHLEAR DEVICE []  Surgeons      * Remigio Preston - Primary    Resident/Fellow/Other Assistant:  Surgeons and Role:  * No surgeons found with a matching role *    Procedure Summary  Anesthesia: General  ASA: II  Anesthesia Staff: Anesthesiologist: Brady Galloway MD  CRNA: Stanislav Paul APRN-CRNA  SRNA: Xavier Wolf RN  Estimated Blood Loss: 5 mL  Intra-op Medications:   Administrations occurring from 1135 to 1530 on 24:   Medication Name Total Dose   dexAMETHasone (Decadron) injection 10 mg   gelatin sponge,absorb-porcine (Gelfoam) sponge 1 each   lidocaine-epinephrine (Xylocaine W/EPI) 1 %-1:100,000 injection 5 mL   sodium chloride 0.9 % irrigation solution 7,000 mL   polymyxin B injection 500,000 Units   EPINEPHrine HCl (PF) (Adrenalin) injection 4 mg              Anesthesia Record               Intraprocedure I/O Totals          Intake    Dexmedetomidine 0.00 mL    The total shown is the total volume documented since Anesthesia Start was filed.    Propofol Drip 0.00 mL    The total shown is the total volume documented since Anesthesia Start was filed.    LR infusion 2100.00 mL    Total Intake 2100 mL          Specimen: No specimens collected     Staff:   Circulator: Lizz Moncada Person: Mitra Abdullahi Circulator: Ally         Drains and/or Catheters: * None in log *    Tourniquet Times:         Implants:  Implants       Type Name Action Serial No.      ENT  Implant COCHLEAR, NUCLEUS , PROFILE PLUS W/SLIM STRAIGHT ELECTRODE - [id number] Implanted [id number]              Findings:     Indications: Vaibhav Caban is an 39 y.o. male who is having surgery for Sensorineural hearing loss (SNHL) of right ear with unrestricted hearing of left ear [H90.41].     The patient was seen in the preoperative area. The risks, benefits, complications, treatment options, non-operative alternatives, expected recovery and outcomes were discussed with the patient. The possibilities of reaction to medication, pulmonary aspiration, injury to surrounding structures, bleeding, recurrent infection, the need for additional procedures, failure to diagnose a condition, and creating a complication requiring transfusion or operation were discussed with the patient. The patient concurred with the proposed plan, giving informed consent.  The site of surgery was properly noted/marked if necessary per policy. The patient has been actively warmed in preoperative area. Preoperative antibiotics have been ordered and given within 1 hours of incision. Venous thrombosis prophylaxis have been ordered including bilateral sequential compression devices    Procedure Details:      [unfilled]     PATIENT NAME: Vaibhav Caban    PATIENT : 1985    PATIENT MRN: [id number]    PREOPERATIVE DIAGNOSIS: Right sensorineural hearing loss.    POSTOPERATIVE DIAGNOSIS: Right sensorineural hearing loss.    PROCEDURE:    1) Right cochlear implantation  2) Use of microscope  3) Unilateral facial nerve monitoring    SURGEON: Remigio Preston MD    RESIDENT: Yobany Pickering MD    ANESTHESIA: General.    ESTIMATED BLOOD LOSS: 10 mL.    COMPLICATIONS: None.    FINDINGS:  Implant Brand: Cochlear Brand 622 electrode   Round Window Angle: Favorable  Cochleostomy: No  Mastoid: Poorly pneumatized  Ossicular chain status: Intact  Chorda tympani nerve status: Intact  Facial nerve: Intact  Insertion Time: 120 seconds  Resistance:  None  Insertion: Smooth  Hearing Preservation Approach: yes  Healon: No  Intraoperative Topical Steroids: yes  SSD Indication: yes  Subperiosteal Pocket: yes  Anchoring Sutures: None  Post-Op Testing: NRT, Impedence, ECoG present after insertion, SmartNav adequate  Intraoperative Imaging: no -      INDICATIONS FOR PROCEDURE: Vaibhav Caban is a 39 y.o. male who presents with right-sided SNHL. After a failed trial with amplification, a cochlear implant evaluation was completed, noting that the patient is a cochlear implant candidate for the right ear. Risks of the surgery were discussed with the patient, including bleeding, infection, loss of residual hearing, dizziness, cerebrospinal fluid leak, meningitis, facial paralysis, taste changes, device infection or malfunction requiring removal or replacement. The patient elected to proceed with a right cochlear implantation.    PROCEDURE IN DETAIL: The patient was identified in preoperative holding with their name and date of birth. After consent was obtained and the risks, alternatives and benefits were explained to the patient, the patient was transported to the operating room and placed supine on the operating table. After adequate general anesthesia was obtained, the patient was strapped to the bed with three straps to secure their position. A timeout was completed with everybody in the OR participating. Facial nerve electrodes were inserted into the right eyebrow and cheek to monitor the facial nerve, and these were confirmed to be working with a tap test. A total of 5 mL of 1% lidocaine with 1:100,000 epinephrine was then injected into the postauricular sulcus behind the right ear. The patient's right ear and face were then prepped in the usual sterile fashion.    A 15-blade was used to incise a postauricular incision approximately 0.5 cm behind the right auricular sulcus. All bleeding was cauterized with a bovie electrocautery. A Lavelle retractor was used to  elevate the ear anteriorly, followed by creation of a Palva flap, which was retracted anteriorly. Further subperiosteal elevation allowed identification of the spine of Henle and the osseous external auditory canal. A 5-round bur was then used to drill the mastoid laterally, carefully identifying the tegmen superiorly, the sigmoid sinus posteriorly and thinning the external auditory canal anteriorly. Drilling was carried from a lateral-to-medial fashion, with subsequent identification of the lateral semicircular canal and incus. Once the incus was identified, a 3-round coarse patti bur was used to carefully identify the facial recess, with completion of the recess using a 2-round coarse patti bur. Once the facial recess was opened, the incudostapedial joint was identified. Care was undertaken to enlarge the facial recess to identify the round window inferiorly.    Once the round window was identified, the drill speed was reduced to 10,000 RPM and a 1-fine patti bur was used to drill the overhang of the round window, with care to avoid the facial nerve and opening the round window. No stimulation of the facial nerve occurred during the procedure. Once the round window was adequately exposed, the surgical bed was copiously irrigated with normal saline. 1 ml of 10 mg/ml dexamethasone was injected around the middle ear and allowed to sit. A tight subperiosteal pocket was then created for the  stimulator. The cochlear implant was opened and placed into the tight subperiosteal pocket posterior superiorly. A round window opening was performed and the electrode was slowly inserted without resistance. Two small pieces of temporalis muscle were harvested and placed in the facial recess to secure the placement of the electrode and to seal the cochleostomy. The ground electrode was inserted superiorly, medial to the temporalis muscle, anterior to the -stimulator. Cochlear implant testing was performed and  demonstrated good position and good impendence/NRT.  A piece of Gelfoam was used to secure the electrode in the mastoid cavity. The Palva flap was then closed with 3-0 vicryl sutures in an interrupted fashion, followed by closure of the subcutaneous tissue with 4-0 monocryl sutures in an interrupted fashion. The skin was then closed using Dermabond, followed by Brad Dressing. The patient was then awakened and returned to the care of anesthesia. The patient tolerated the procedure well and there were no complications.    I completed the procedure with the assistance of my resident, Dr. Pickering     Complications:  None; patient tolerated the procedure well.    Disposition: PACU - hemodynamically stable.  Condition: stable     Attending Attestation: I was present and scrubbed for the entire procedure.    Remigio Preston  Phone Number: [phone]

## 2024-07-17 NOTE — ANESTHESIA PREPROCEDURE EVALUATION
Patient: Vaibhav Caban    Procedure Information       Date/Time: 07/17/24 1135    Procedure: Insertion Cochlear Implant (Right) - WITH ECOG    [place] [facility] [place] [facility] [place] / [facility] [place] [facility] [place]    Surgeons: Remigio Preston MD            Relevant Problems   Anesthesia (within normal limits)      Cardiac   (+) Atrial flutter (Multi)      Pulmonary (within normal limits)      Neuro (within normal limits)      GI (within normal limits)      /Renal   (+) BPH with obstruction/lower urinary tract symptoms   (+) BPH without urinary obstruction      Liver (within normal limits)      Endocrine   (+) Hyperthyroidism      HEENT   (+) Sensorineural hearing loss (SNHL) of right ear with unrestricted hearing of left ear       Clinical information reviewed:   Tobacco  Allergies  Meds   Med Hx  Surg Hx   Fam Hx  Soc Hx        NPO Detail:  NPO/Void Status  Date of Last Liquid: 07/17/24  Time of Last Liquid: 0730  Date of Last Solid: 07/16/24  Time of Last Solid: 1930         Physical Exam    Airway  Mallampati: I  TM distance: >3 FB  Neck ROM: full     Cardiovascular - normal exam     Dental - normal exam     Pulmonary - normal exam     Abdominal - normal exam         Anesthesia Plan    History of general anesthesia?: yes  History of complications of general anesthesia?: no    ASA 2     general     The patient is not a current smoker.    intravenous induction   Anesthetic plan and risks discussed with patient.    Plan discussed with CRNA.

## 2024-07-17 NOTE — PROGRESS NOTES
Cochlear Implant Intra-Operative Monitoring     Date: 7/17/2024  Surgeon: Dr. Remigio Preston  Audiologist / Audiology Extern: Hazel Laws, Ph.D. CCC-A / ROCHELLE Russell.     Implant Name Type Inv. Item Serial No.  Lot No. LRB No. Used Action   COCHLEAR, NUCLEUS , PROFILE PLUS W/SLIM STRAIGHT ELECTRODE - [id number] ENT Implant COCHLEAR, NUCLEUS , PROFILE PLUS W/SLIM STRAIGHT ELECTRODE [id number] COCHLEAR [employer] [id number] Right 1 Implanted      Electrode Insertion Achieved: Full     Placement Check: Within normal limits    Impedances:  Impedances were measured on electrodes 1-22 and within normal limits.     eSRT: DNT    NRTs: Completed on all electrodes, see below.        ECoG: Intracochlear electrocochleography was performed for monitoring of residual hearing.        Equipment and Forms:   The patient's equipment backpack was provided to the family. They were counseled to bring the backpack to activation. Post-operative course was reviewed with the family. The patient is instructed to stop using their hearing aid on the newly implanted ear. They are encouraged to continue using their hearing aid on the non-implanted ear.     The family was provided with the internal implant guide, MRI compatibility booklet, and implant identification card specific to the patient's implant. The patient should place this in their wallet. The family was counseled that the internal implant was registered; indicating the start of the 10 year 's warranty. The external equipment, located in the backpack provided, will be registered at the activation; indicating the start of the 5 year 's warranty on the processor.     The activation follow up schedule was reviewed and a completed form with the appointments was provided.     Post operative questions should be  guided to the Patient Navigator Cochlear Implant Program, Trinidad Aguero RN at [phone].    Appointments  The patient is scheduled for follow up with Slim Shin CCC-RADHA at the [facility].          Activation:   Date/Time : 8/9/2024 at 2:30 PM    2nd Stimulation:  Date/Time: 9/6/2024 at 3:00 PM    3rd Stimulation:  Date/Time: 11/1/2024 at 3:00 PM      Intraoperative testing was completed by DURAN Myers. Reviewed by Hazel Laws, Ph.D. JAYLEN

## 2024-07-17 NOTE — H&P
History Of Present Illness  Vaibhav Caban is a 39 y.o. male presenting with right-sided sensorineural hearing loss, deemed an appropriate candidate for cochlear implantation on evaluation with audiology. Given this, decision was made to proceed to the operating room for placement of right sided cochlear implant. This was after discussing the risks, benefits, and alternatives of proceeding. There have been no major changes to patient's medical status since the outpatient ENT visit. Patient is overall in usual state of health this morning.      Past Medical History  He has a past medical history of Disease of thyroid gland (On March 2020) and Tinnitus (May 2023).    Surgical History  He has a past surgical history that includes Foot surgery; Bunionectomy; and Colonoscopy.     Social History  He reports that he has never smoked. He has been exposed to tobacco smoke. He has never used smokeless tobacco. He reports that he does not currently use alcohol. He reports that he does not use drugs.    Family History  Family History   Family history unknown: Yes        Allergies  Patient has no known allergies.    ROS:  Complete ROS negative other than mentioned in the HPI.     PHYSICAL EXAMINATION:  General Healthy-appearing, well-nourished, well groomed, in no acute distress.   Neuro: Developmentally appropriate for age. Reacts appropriately to commands or stimuli.   Extremities Normal. Good tone.  Respiratory No increased work of breathing. Chest expands symmetrically. No stertor or stridor at rest.  Cardiovascular: No peripheral cyanosis. No jugular venous distension.   Head and Face: Atraumatic with no masses, lesions, or scarring.   Eyes: EOM intact, conjunctiva non-injected, sclera white.   Nose: no external nasal lesions, lacerations, or scars.  Neck: Symmetrical, trachea midline.   Skin: Normal without rashes or lesions.       Last Recorded Vitals  Blood pressure 135/65, pulse 81, temperature 36.8 °C (98.2 °F),  "temperature source Tympanic, resp. rate 16, height 1.702 m (5' 7\"), weight 65 kg (143 lb 4.8 oz), SpO2 98%.    Assessment/Plan   Vaibhav Caban is a 39 y.o. male presenting with sensorineural hearing loss who is a good candidate for cochlear implantation with hearing preservation.     At this time, we will proceed to the operating room for placement of right-sided cochlear implantation.    Risks, benefits, and alternatives were discussed with the patient. All other questions were answered.     "

## 2024-07-17 NOTE — ANESTHESIA POSTPROCEDURE EVALUATION
Patient: Vaibhav Caban    Procedure Summary       Date: 07/17/24 [place] [facility] [place] [facility] [place] [facility] [place] [facility] [place] [facility] [place]    Anesthesia Start: 1126 Anesthesia Stop: 1509    Procedure: Insertion Cochlear Implant (Right) Diagnosis:       Sensorineural hearing loss (SNHL) of right ear with unrestricted hearing of left ear      (Sensorineural hearing loss (SNHL) of right ear with unrestricted hearing of left ear [H90.41])    Surgeons: Remigio Preston MD Responsible Provider: Brady Galloway MD    Anesthesia Type: general ASA Status: 2            Anesthesia Type: general    Vitals Value Taken Time   /64 07/17/24 1509   Temp  07/17/24 1509   Pulse 70 07/17/24 1509   Resp 12 07/17/24 1509   SpO2 99 07/17/24 1509       Anesthesia Post Evaluation    Patient location during evaluation: bedside  Patient participation: complete - patient participated  Level of consciousness: awake and awake and alert  Pain score: 0  Pain management: adequate  Airway patency: patent  Cardiovascular status: acceptable  Respiratory status: acceptable  Hydration status: acceptable  Postoperative Nausea and Vomiting: none        No notable events documented.

## 2024-07-24 ENCOUNTER — TELEPHONE (OUTPATIENT)
Dept: OTOLARYNGOLOGY | Facility: CLINIC | Age: 39
End: 2024-07-24
Payer: COMMERCIAL

## 2024-07-24 NOTE — TELEPHONE ENCOUNTER
Attempt x2; spoke with patient regarding any post operative concerns. Patient states that he has had no ill effects and will be present for his POV scheduled on 7/26 at  in [place].

## 2024-07-26 ENCOUNTER — OFFICE VISIT (OUTPATIENT)
Dept: OTOLARYNGOLOGY | Facility: CLINIC | Age: 39
End: 2024-07-26
Payer: COMMERCIAL

## 2024-07-26 VITALS
WEIGHT: 147.5 LBS | BODY MASS INDEX: 23.15 KG/M2 | RESPIRATION RATE: 16 BRPM | OXYGEN SATURATION: 99 % | SYSTOLIC BLOOD PRESSURE: 113 MMHG | HEIGHT: 67 IN | DIASTOLIC BLOOD PRESSURE: 72 MMHG | HEART RATE: 73 BPM | TEMPERATURE: 98.6 F

## 2024-07-26 DIAGNOSIS — H90.41 SENSORINEURAL HEARING LOSS (SNHL) OF RIGHT EAR WITH UNRESTRICTED HEARING OF LEFT EAR: Primary | ICD-10-CM

## 2024-07-26 PROCEDURE — 99211 OFF/OP EST MAY X REQ PHY/QHP: CPT | Performed by: STUDENT IN AN ORGANIZED HEALTH CARE EDUCATION/TRAINING PROGRAM

## 2024-07-26 PROCEDURE — 1036F TOBACCO NON-USER: CPT | Performed by: STUDENT IN AN ORGANIZED HEALTH CARE EDUCATION/TRAINING PROGRAM

## 2024-07-26 PROCEDURE — 3008F BODY MASS INDEX DOCD: CPT | Performed by: STUDENT IN AN ORGANIZED HEALTH CARE EDUCATION/TRAINING PROGRAM

## 2024-07-26 SDOH — ECONOMIC STABILITY: FOOD INSECURITY: WITHIN THE PAST 12 MONTHS, YOU WORRIED THAT YOUR FOOD WOULD RUN OUT BEFORE YOU GOT MONEY TO BUY MORE.: NEVER TRUE

## 2024-07-26 SDOH — ECONOMIC STABILITY: FOOD INSECURITY: WITHIN THE PAST 12 MONTHS, THE FOOD YOU BOUGHT JUST DIDN'T LAST AND YOU DIDN'T HAVE MONEY TO GET MORE.: NEVER TRUE

## 2024-07-26 ASSESSMENT — PAIN SCALES - GENERAL: PAINLEVEL: 0-NO PAIN

## 2024-07-26 ASSESSMENT — LIFESTYLE VARIABLES
AUDIT-C TOTAL SCORE: 0
HOW MANY STANDARD DRINKS CONTAINING ALCOHOL DO YOU HAVE ON A TYPICAL DAY: PATIENT DOES NOT DRINK
SKIP TO QUESTIONS 9-10: 1
HOW OFTEN DO YOU HAVE SIX OR MORE DRINKS ON ONE OCCASION: NEVER
HOW OFTEN DO YOU HAVE A DRINK CONTAINING ALCOHOL: NEVER

## 2024-07-26 ASSESSMENT — ENCOUNTER SYMPTOMS
DEPRESSION: 0
OCCASIONAL FEELINGS OF UNSTEADINESS: 0
LOSS OF SENSATION IN FEET: 0

## 2024-07-26 ASSESSMENT — PATIENT HEALTH QUESTIONNAIRE - PHQ9
2. FEELING DOWN, DEPRESSED OR HOPELESS: NOT AT ALL
SUM OF ALL RESPONSES TO PHQ9 QUESTIONS 1 AND 2: 0
1. LITTLE INTEREST OR PLEASURE IN DOING THINGS: NOT AT ALL

## 2024-07-26 NOTE — PROGRESS NOTES
"  Date of surgery: 7/17/2024     CC: Right Cochlear Implant Post-op visit    SUBJECTIVE:  Patient underwent a Right cochlear implant procedure.  No complaints and specifically denies vertigo, dizziness, otorrhea, or otalgia.    ROS:  He denies constitutional symptoms of fatigue, weakness, weight loss or gain, fevers, night sweats.     OBJECTIVE:    The patient is well-developed, well-nourished in no acute distress with a good ability to communicate without hearing aids.  He has normal facial strength and symmetry and is alert and oriented to time, person, and place with appropriate mood and affect.     Vitals:    07/26/24 1614   BP: 113/72   Pulse: 73   Resp: 16   Temp: 37 °C (98.6 °F)   TempSrc: Temporal   SpO2: 99%   Weight: 66.9 kg (147 lb 8 oz)   Height: 1.702 m (5' 7\")        Incision healing well.  He does have hemoptympanum.  The tympanic membrane is intact.    Operative findings:   Implant Brand: Cochlear Brand 622 electrode   Round Window Angle: Favorable  Cochleostomy: No  Mastoid: Poorly pneumatized  Ossicular chain status: Intact  Chorda tympani nerve status: Intact  Facial nerve: Intact  Insertion Time: 120 seconds  Resistance: None  Insertion: Smooth  Hearing Preservation Approach: yes  Healon: No  Intraoperative Topical Steroids: yes  SSD Indication: yes  Subperiosteal Pocket: yes  Anchoring Sutures: None  Post-Op Testing: NRT, Impedence, ECoG present after insertion, SmartNav adequate  Intraoperative Imaging: no -     ASSESSMENT and PLAN:  Right sensorineural hearing loss s/p cochlear implantation. Doing well.  Operative findings were discussed with the patient.  -- Cleared for activation  -- Follow-up in 6 months     Remigio Preston MD  "

## 2024-08-08 ENCOUNTER — APPOINTMENT (OUTPATIENT)
Dept: AUDIOLOGY | Facility: CLINIC | Age: 39
End: 2024-08-08
Payer: COMMERCIAL

## 2024-08-09 ENCOUNTER — APPOINTMENT (OUTPATIENT)
Dept: AUDIOLOGY | Facility: CLINIC | Age: 39
End: 2024-08-09
Payer: COMMERCIAL

## 2024-08-12 ENCOUNTER — APPOINTMENT (OUTPATIENT)
Dept: SPEECH THERAPY | Facility: CLINIC | Age: 39
End: 2024-08-12
Payer: COMMERCIAL

## 2024-08-23 ENCOUNTER — CLINICAL SUPPORT (OUTPATIENT)
Dept: AUDIOLOGY | Facility: CLINIC | Age: 39
End: 2024-08-23
Payer: COMMERCIAL

## 2024-08-23 DIAGNOSIS — H90.41 SENSORINEURAL HEARING LOSS (SNHL) OF RIGHT EAR WITH UNRESTRICTED HEARING OF LEFT EAR: Primary | ICD-10-CM

## 2024-08-23 PROCEDURE — 92603 COCHLEAR IMPLT F/UP EXAM 7/>: CPT | Performed by: AUDIOLOGIST

## 2024-08-23 NOTE — PROGRESS NOTES
Name: Vaibhav Caban  YOB: 1985  Age: 39 y.o.    Date of Evaluation:  08/23/2024    Vaibhav Caban, 39 y.o. , was seen for the initial activation of the  cochlear implant to be used in conjunction with the [id number] speech processor. Vaibhav was implanted by Dr. Preston on 7/17/2024. Post-operative course remains unremarkable.    Recall:  He had a sudden sensorineural hearing loss in the right ear in May 2023. He was seen by an outside ENT in June 2023 and put on a steroid pack and intra-tympanic membrane injections. His hearing did not resolve.      Hearing test from 6/14/2023 indicates a moderately-severe sensorineural hearing loss 250-2000 Hz rising to mild to normal hearing through 8000 Hz. Of note, tympanograms, acoustic reflexes, and distortion-product otoacoustic emissions were not completed at this time.    Programming:  Impedances were evaluated using the ear level [id number] speech processor for intra-cochlear electrodes 1-22 in common ground (CG), Monopolar 1 (MP1), Monopolar 2 (MP2) and MP1+2 stimulation modes.  Satisfactory impedances were found for all electrodes, in each stimulation mode.      Visual inspection of the surgical site reveals swelling around the magnet site. 4i magnet was order and is too weak. Clinic stock 5i magnet was used until swelling resolved. 5i magnet was also weak but connected with a hair clip for better retention. Vaibhav was encouraged to wear a headband until the swelling resolves.    The [id number] processor and remote control were dispensed with all associated equipment and accessories.  The processor was programmed in an MP1+2 stimulation mode using an ACE speech processing strategy, 8 maxima and a 900 Hz stimulation rate.  Electrodes 1-22 were activated.  Using a population mean technique, a map was created with good reliability.  C-levels were swept in bands of 3 for comfortable loudness. T-levels were decreased slightly globally to reduce echo sensation. No facial  nerve stimulation was observed.  Progressive maps were created and programmed with increments of 5 clinical units.    Vaibhav was oriented to the speech processor, remote assistant and accessories.  He was also given written and recorded instructional materials.  All equipment was registered electronically.  The Nucleus Smart Layo was not downloaded on patient's cell phone until he becomes more comfortable with the sound quality.  Wireless accessories will be paired and reviewed at a future visit.    Overall, Vaibhav reports hearing voices and words and was able to repeat spondees without visual cues. Vaibhav is scheduled for a second stimulation on 9/20/2024.    [facility] Questions:  Did the patient meet with the Cochlear Cabanas  prior to surgery?  If the answer is no:  If no, why?  Not scheduled      Treatment Plan:  1. Utilize the  Cochlear Implant System and the N8  speech processor daily moving through progressive programs P1-P4 as indicated.  2. Return for remapping and optimization of the speech processor as scheduled in approximately one month in order to optimize the psychophysical ACE map.  3. Utilize aural rehabilitation/auditory training techniques at home to improve understanding ability. Recommend establish care with auditory verbal therapy resources through [facility]   4. ENT follow up annually    Time: 7989-4891    Completed by:  Slim Schafer, CCC-A  Licensed Senior Audiologist

## 2024-09-06 ENCOUNTER — APPOINTMENT (OUTPATIENT)
Dept: AUDIOLOGY | Facility: CLINIC | Age: 39
End: 2024-09-06
Payer: COMMERCIAL

## 2024-09-16 ENCOUNTER — EVALUATION (OUTPATIENT)
Dept: SPEECH THERAPY | Facility: CLINIC | Age: 39
End: 2024-09-16
Payer: COMMERCIAL

## 2024-09-16 DIAGNOSIS — H90.41 SENSORINEURAL HEARING LOSS (SNHL) OF RIGHT EAR WITH UNRESTRICTED HEARING OF LEFT EAR: Primary | ICD-10-CM

## 2024-09-16 DIAGNOSIS — R48.8 OTHER SYMBOLIC DYSFUNCTIONS: ICD-10-CM

## 2024-09-16 PROCEDURE — 92523 SPEECH SOUND LANG COMPREHEN: CPT | Mod: GN

## 2024-09-16 ASSESSMENT — PAIN SCALES - GENERAL: PAINLEVEL_OUTOF10: 0 - NO PAIN

## 2024-09-16 ASSESSMENT — PAIN - FUNCTIONAL ASSESSMENT: PAIN_FUNCTIONAL_ASSESSMENT: 0-10

## 2024-09-18 PROBLEM — R48.8 OTHER SYMBOLIC DYSFUNCTIONS: Status: ACTIVE | Noted: 2024-09-16

## 2024-09-18 PROBLEM — R48.8 OTHER SYMBOLIC DYSFUNCTIONS: Status: ACTIVE | Noted: 2024-09-18

## 2024-09-20 ENCOUNTER — CLINICAL SUPPORT (OUTPATIENT)
Dept: AUDIOLOGY | Facility: CLINIC | Age: 39
End: 2024-09-20
Payer: COMMERCIAL

## 2024-09-20 DIAGNOSIS — H90.41 SENSORINEURAL HEARING LOSS (SNHL) OF RIGHT EAR WITH UNRESTRICTED HEARING OF LEFT EAR: Primary | ICD-10-CM

## 2024-09-20 PROCEDURE — 92604 REPROGRAM COCHLEAR IMPLT 7/>: CPT | Performed by: AUDIOLOGIST

## 2024-09-20 NOTE — PROGRESS NOTES
Name: Vaibhav Caban  YOB: 1985  Age: 39 y.o.    Date of Evaluation:  09/20/2024    Vaibhav Caban, 39 y.o. , was seen for the second stimulation of the  cochlear implant to be used in conjunction with the [id number] speech processor. Vaibhav was implanted by Dr. Preston on 7/17/2024. Post-operative course remains unremarkable.    Recall:  He had a sudden sensorineural hearing loss in the right ear in May 2023. He was seen by an outside ENT in June 2023 and put on a steroid pack and intra-tympanic membrane injections. His hearing did not resolve.      Hearing test from 6/14/2023 indicates a moderately-severe sensorineural hearing loss 250-2000 Hz rising to mild to normal hearing through 8000 Hz. Of note, tympanograms, acoustic reflexes, and distortion-product otoacoustic emissions were not completed at this time.    Programming:  Impedances were evaluated using the ear level [id number] speech processor for intra-cochlear electrodes 1-22 in common ground (CG), Monopolar 1 (MP1), Monopolar 2 (MP2) and MP1+2 stimulation modes.  Satisfactory impedances were found for all electrodes, in each stimulation mode.      Data logging indicates 16+hours of daily use.    Visual inspection of the surgical site reveals reduced swelling around the magnet site. 5i magnet appropriate while connected with a hair clip for better retention.     The [id number] processor and remote control were dispensed with all associated equipment and accessories.  The processor was programmed in an MP1+2 stimulation mode using an ACE speech processing strategy, 8 maxima and a 900 Hz stimulation rate.  Electrodes 1-22 were activated.  Using a population mean technique, a map was created with good reliability.  C-levels were swept in bands of 3 for comfortable loudness. T-levels were decreased globally to reduce echo sensation. No facial nerve stimulation was observed.      Vaibhav was oriented to the speech processor, remote assistant and accessories.  He  was also given written and recorded instructional materials.  All equipment was registered electronically.  The Nucleus Smart Layo was not downloaded on patient's cell phone until he becomes more comfortable with the sound quality.  Wireless accessories will be paired and reviewed at a future visit.    Overall, Vaibhav reports hearing voices and words and was able to repeat spondees without visual cues. Vaibhav is scheduled for the third stimulation and aided testing on 11/15/2024.    [facility] Questions:  Did the patient meet with the [facility]  prior to surgery?  If the answer is no:  If no, why?  Not scheduled      Treatment Plan:  1. Utilize the  Cochlear Implant System and the N8  speech processor daily moving through progressive programs P1-P4 as indicated.  2. Return for remapping and optimization of the speech processor as scheduled in approximately one month in order to optimize the psychophysical ACE map.  3. Utilize aural rehabilitation/auditory training techniques at home to improve understanding ability. Recommend establish care with auditory verbal therapy resources through [facility]   4. ENT follow up annually    Time: 4867-1793    Completed by:  Slim Schafer, CCC-A  Licensed Senior Audiologist

## 2024-09-20 NOTE — PROGRESS NOTES
Speech-Language Pathology    Auditory Evaluation      Patient Name: Vaibhav Caban  MRN: [id number]  Today's Date: 9/20/2024  Time Calculation  Start Time: 1510  Stop Time: 1645  Time Calculation (min): 95 min     Current Problem:  Patient Active Problem List   Diagnosis    Abscess of axilla, right    Sensorineural hearing loss (SNHL) of right ear with unrestricted hearing of left ear    Atrial flutter (Multi)    BPH with obstruction/lower urinary tract symptoms    BPH without urinary obstruction    Elevated alkaline phosphatase level    Frequency of micturition    Hesitancy    Hyperglycemia    Nocturia    Tachycardia    Hyperthyroidism    Thyrotoxicosis    Other symbolic dysfunctions      Impressions and Recommendations:  MODERATE-SEVERE Auditory Skills deficits impacting communication; skilled therapy warranted to increase communication.   Recommendations: Communication Therapy, Home Program  Follow-up aural rehab therapy  Continue follow up with ENT and AUD as recommended    GOALS:  Long Term Goal:  Patient will increase comprehension of conversation utilizing cochlear implant only with 80% accuracy in 6 months.    Short Term Goals:  Patient will show understanding of hearing assistive technology, applications, streaming, and home programming in 80% of opportunities, in 3 months.  Patient will identify minimal pairs differing in voice and place of articulation in quiet and noise with 90% in 3 months.  Patient will comprehend phrases in quiet and noise with no visual cues with 90% accuracy in 3 months.  Patient will comprehend sentences in quiet and noise with no visual cues with 90% accuracy in 4 months.  Patient will comprehend 2-3 sentences in quiet and noise with 90% accuracy in 6 months.    Resonance-Voice Assessment:  Assessments Used: Informal  Articulation Screening: Age appropriate  Nasal Resonance: Normal  Nasal Air Emissions: Not present  Voice: Normal  Speech Inteligibility: 100%    Patient Subjective  Assessment:  On TELEGRAM, Pt Reported Score - Telephone: 5  Pt Reported Score - Groups: 5  Pt Reported Score - Employment: 5  Pt Reported Score - Recreation: 5  Pt Reported Score - Legislation: Other:(comment), 1 (n/a)  Pt Reported Score - Alarms: 1  Pt Reported Score - Entertainment: 5    Auditory Skill Assessment:  Amplification used skill assessment: Right    Auditory Plan of Care:  Recommend Treatment: Yes  Frequency: 1 time per month  Duration: 6 months  Recommendations for Therapeutic Intervention: Given a Home Program Targeting Goal(s) (Word success nicky)  Prognosis: Excellent  Factors Affecting Prognosis: None  Discussed POC: Patient  The patient's family/caregiver was educated regarding appropriate motivation and expectations for a cochlear implant (CI) and the CI process.: Yes      Subjective   Current Problem:   Patient arrived on time to today's aural rehab evaluation. Patient reported he lost his hearing in his L ear last year due to sudden onset. Patient added that the sounds he is hearing with his CI are clearer (eg, at [employer]), but that he still needs to look around at times to identify the sound. During the evaluation it was noted that patient's phone is not compatible with his CI and a wireless cochlear phone clip was sent home with patient. As reported, patient is also not missing out on as much information during conversations with other people. Patient has not yet started speaking on the phone or listening to music using his R CI. Patient reported having frequent headaches prior to CI activation but that these have since then resolved.     General Visit Information:  Recommendations: Communication Therapy, Home Program  Living Environment: Home  Language at home: Spoken English, Other: (comment) (Yi)   Present: No  Arrival: Independent  Reason for Referral: Aural rehabilitation s/p cochlear implantation  Certification Period Start Date: 09/16/24  Certification Period End Date:  12/16/24  Number of  Authorized Treatments :  (Unlimited)  Prior Level of Function: WFL    Provider:  Audiology: Yamel Perea  ENT: Remigio Preston    Pain:  Pain Assessment  Pain Assessment: 0-10  0-10 (Numeric) Pain Score: 0 - No pain    Objective     Baseline Observations:  Hearing Loss: Sudden (All at once)  Duration of Hearing Loss: 1 year  Etiology of hearing loss: Unknown  Current Amplification: Worn during evaluation  Left Ear: None (L ear WNL)  Date of amplification left: n/a  Right Ear: Cochlear Implant  Date of amplification right: 8/23/24  Amplification Worn During Evaluation: Yes  Hours Worn: Whenever awake    Pre-Feature Identification Contrasts:  Presence vs Absence : 100%  Long vs Short : 100%  Continuous vs Interrupted : 100%  1 vs 3 Syllables : 100%  1 vs 2 Syllables : 100%  Same number of syllables : 100%  Total Percentage: 100%    Patient scored WNL on these tasks; suspect due to L ear (WNL) not being occluded for testing.     Cochlear Rodney Rehab Manual Screening & Exercise:  Sentence Length Identification : 100%  Phrase Length Identification : 100%  Paragraph Tracking : 100%    Patient scored WNL on these tasks, suspect due to L ear (WNL) not being occluded for testing.     Common Phrase Test:  Common Phrase Test (CPT)  In Quiet : 100%    Patient scored WNL on this tasks; suspect due to L ear (WNL) not being occluded for testing.     Minimal Pairs Test:  The [employer] Word Success Listening Test was administered and the patient scored as follows:    Level 1 (words varying by syllable length): 100%  Level 2 (different consonants and vowels): 100%  Level 3 (different vowels): 100%  Level 4 (consonant manner in initial position): 80%  Level 5 (consonant manner in final position): 80%  Level 6 (consonant voicing in final position): 40%  Level 7 (consonant voicing in initial position): 40%  Level 8 (consonant place in initial position): 60%  Level 9 (consonant place in final position):  60%    These results indicate MODERATE-SEVERE Auditory Skills deficits in identification of voicing and place cues for consonants at word level.    Auditory Education:  Treatment Performed Today: No  Individual(s) Educated: Patient  Verbal Education Provided: Risks/Benefits of Therapy, Results of Testing, Exercises  Written Education Provided: Exercises  Response to Educaton: Verbalized Understanding  Patient's Learning Preference(s): Demonstration, Printed Materials, Explanation/Discussion  Patient/caregiver verbalized understanding and agreement.: Yes

## 2024-10-01 ENCOUNTER — APPOINTMENT (OUTPATIENT)
Dept: AUDIOLOGY | Facility: CLINIC | Age: 39
End: 2024-10-01
Payer: COMMERCIAL

## 2024-11-01 ENCOUNTER — APPOINTMENT (OUTPATIENT)
Dept: AUDIOLOGY | Facility: CLINIC | Age: 39
End: 2024-11-01
Payer: COMMERCIAL

## 2024-11-15 ENCOUNTER — CLINICAL SUPPORT (OUTPATIENT)
Dept: AUDIOLOGY | Facility: CLINIC | Age: 39
End: 2024-11-15
Payer: COMMERCIAL

## 2024-11-15 DIAGNOSIS — H90.41 SENSORINEURAL HEARING LOSS (SNHL) OF RIGHT EAR WITH UNRESTRICTED HEARING OF LEFT EAR: Primary | ICD-10-CM

## 2024-11-15 PROCEDURE — 92604 REPROGRAM COCHLEAR IMPLT 7/>: CPT | Performed by: AUDIOLOGIST

## 2024-11-15 PROCEDURE — 92626 EVAL AUD FUNCJ 1ST HOUR: CPT | Mod: 59 | Performed by: AUDIOLOGIST

## 2024-11-15 NOTE — PROGRESS NOTES
Name: Vaibhav Caban  YOB: 1985  Age: 39 y.o.    Date of Evaluation:  11/15/2024    Vaibhav Caban, 39 y.o. , was seen for the third stimulation of the  cochlear implant to be used in conjunction with the [id number] speech processor. Vaibhav was implanted by Dr. Preston on 7/17/2024. Post-operative course remains unremarkable.    Recall:  He had a sudden sensorineural hearing loss in the right ear in May 2023. He was seen by an outside ENT in June 2023 and put on a steroid pack and intra-tympanic membrane injections. His hearing did not resolve.      Hearing test from 6/14/2023 indicates a moderately-severe sensorineural hearing loss 250-2000 Hz rising to mild to normal hearing through 8000 Hz. Of note, tympanograms, acoustic reflexes, and distortion-product otoacoustic emissions were not completed at this time.    Programming:  Aided testing was completed in the sound field while Vaibhav wore the Kanso 2 speech processor on the right ear. Left ear was plugged (insert) and muffed (supraural). Responses to warble tone stimuli were recorded in the mild range from 25-35 dB HL from 250-6000 Hz. Word and Sentence understanding were completed. See below     CNC at 50 dB HL Az-Bio  at 50 dB HL Az-Bio at 50 dB HL with 40 dB HL speech babble   Pre-Op  Scores 40 % 56 % 55 %   Post-Op  Scores 44 % 56 % 55 %         Impedances were evaluated using the ear level [id number] speech processor for intra-cochlear electrodes 1-22 in common ground (CG), Monopolar 1 (MP1), Monopolar 2 (MP2) and MP1+2 stimulation modes.  Satisfactory impedances were found for all electrodes, in each stimulation mode.      Data logging indicates 16+hours of daily use.    Visual inspection of the surgical site reveals reduced swelling around the magnet site. 5i magnet appropriate while connected with a hair clip for better retention.     The [id number] processor and remote control were dispensed with all associated equipment and accessories.  The processor  was programmed in an MP1+2 stimulation mode using an ACE speech processing strategy, 8 maxima and a 900 Hz stimulation rate.  Electrodes 1-22 were activated.  Using a population mean technique, a map was created with good reliability.  C-levels were swept in bands of 3 for comfortable loudness. T-levels were decreased globally to reduce echo sensation. No facial nerve stimulation was observed.      A second map was created programmed in a MP1+2 stimulation mode using an ACE speech processing strategy, 8 maxima and a 900 Hz stimulation rate.  Electrodes 1-22 were activated.  Using intra-operative NRT data, a map was created with good reliability.  C-levels were swept in bands of 3 for comfortable loudness. T-levels were decreased globally to reduce echo sensation. No facial nerve stimulation was observed.      Vaibhav was counseled to try both maps and determine which map provides better benefit. Follow-up in 3-4 months for aided testing.    CI-QOL completed today via .      Treatment Plan:  1. Utilize the  Cochlear Implant System and the N8  speech processor daily moving through progressive programs P1-P4 as indicated.  2. Return for remapping and optimization of the speech processor as scheduled in approximately one month in order to optimize the psychophysical ACE map.  3. Utilize aural rehabilitation/auditory training techniques at home to improve understanding ability. Recommend establish care with auditory verbal therapy resources through [facility]   4. ENT follow up annually    Time: 2276-4482    Completed by:  Slim Schafer, CCC-A  Licensed Senior Audiologist

## 2025-01-22 ENCOUNTER — APPOINTMENT (OUTPATIENT)
Dept: OTOLARYNGOLOGY | Facility: CLINIC | Age: 40
End: 2025-01-22
Payer: COMMERCIAL

## 2025-01-22 VITALS — HEIGHT: 67 IN | WEIGHT: 160 LBS | BODY MASS INDEX: 25.11 KG/M2

## 2025-01-22 DIAGNOSIS — H90.3 ASYMMETRICAL SENSORINEURAL HEARING LOSS: Primary | ICD-10-CM

## 2025-01-22 DIAGNOSIS — Z96.21 COCHLEAR IMPLANT IN PLACE: ICD-10-CM

## 2025-01-22 PROBLEM — Z86.39 HISTORY OF THYROID DISORDER: Status: ACTIVE | Noted: 2025-01-22

## 2025-01-22 PROCEDURE — 99214 OFFICE O/P EST MOD 30 MIN: CPT | Performed by: NURSE PRACTITIONER

## 2025-01-22 PROCEDURE — 3008F BODY MASS INDEX DOCD: CPT | Performed by: NURSE PRACTITIONER

## 2025-01-22 NOTE — LETTER
January 23, 2025     Florian Wang MD  [address] [facility] [address]    Patient: Vaibhav Caban   YOB: 1985   Date of Visit: 1/22/2025       Dear Dr. Florian Wang MD:    Thank you for referring Vaibhav Caban to me for evaluation. Below are my notes for this consultation.  If you have questions, please do not hesitate to call me. I look forward to following your patient along with you.       Sincerely,     Raymond Coates, APRN-CNP      CC: No Recipients  ______________________________________________________________________________________    Subjective  Patient ID: Vaibhav Caban is a 39 y.o. male who presents for  CI IMPLANT Follow-up.  HPI  Patient with history of sudden sensorineural hearing loss in May 2023 in the right ear.  He underwent 3 intratympanic steroid injection with very minimal improvement.  He had significant tinnitus in the right ear as well as bouts of dizziness.  After hearing test CI was discussed and he underwent right cochlear implant procedure by Dr. Preston on 7/17/2024.    Postop course unremarkable.    Patient currently has  cochlear implant used in conjunction with CP 1150 speech processor.       Patient has no complaints of ear pain or magnet site pain.  He is still adjusting to the current mapping.  He states that he can hear so much better with the cochlear implant.  No complaints of tinnitus, dizziness or vertigo.    He uses his CI all day averages about 16 hours/day of use.  He is actively utilizing auditory training techniques.    He has a follow-up with audiology for mapping in February-March 2025.                    PER DR. PRESTON NOTE 7/26/24  Date of surgery: 7/17/2024      CC: Right Cochlear Implant Post-op visit     SUBJECTIVE:  Patient underwent a Right cochlear implant procedure.  No complaints and specifically denies vertigo, dizziness, otorrhea, or otalgia.     ROS:  He denies constitutional symptoms of fatigue, weakness, weight  "loss or gain, fevers, night sweats.      OBJECTIVE:     The patient is well-developed, well-nourished in no acute distress with a good ability to communicate without hearing aids.  He has normal facial strength and symmetry and is alert and oriented to time, person, and place with appropriate mood and affect.      Vitals       Vitals:     07/26/24 1614   BP: 113/72   Pulse: 73   Resp: 16   Temp: 37 °C (98.6 °F)   TempSrc: Temporal   SpO2: 99%   Weight: 66.9 kg (147 lb 8 oz)   Height: 1.702 m (5' 7\")            Incision healing well.  He does have hemoptympanum.  The tympanic membrane is intact.     Operative findings:   Implant Brand: Cochlear Brand 622 electrode   Round Window Angle: Favorable  Cochleostomy: No  Mastoid: Poorly pneumatized  Ossicular chain status: Intact  Chorda tympani nerve status: Intact  Facial nerve: Intact  Insertion Time: 120 seconds  Resistance: None  Insertion: Smooth  Hearing Preservation Approach: yes  Healon: No  Intraoperative Topical Steroids: yes  SSD Indication: yes  Subperiosteal Pocket: yes  Anchoring Sutures: None  Post-Op Testing: NRT, Impedence, ECoG present after insertion, SmartNav adequate  Intraoperative Imaging: no -      ASSESSMENT and PLAN:  Right sensorineural hearing loss s/p cochlear implantation. Doing well.  Operative findings were discussed with the patient.  -- Cleared for activation  -- Follow-up in 6 months      Remigio Preston MD            Electronically signed by Remigio Preston MD at 7/26/2024  6:41 PM      Objective  Physical Exam  CONSTITUTIONAL: No acute distress, normal facial features; No fever; no chills  VOICE: No hoarseness or other audible abnormality  RESPIRATION: Breathing comfortably, no stridor; normal breathing effort  CV: No cyanosis visible on the face and neck area  EYES:Pupils equal and round ; no erythema; conjunctiva clear; sclera white  NEURO: Alert and oriented, able to raise eyebrows symmetrical bilateral, smile with no facial droop, " able to swallow  HEAD AND FACE: Symmetric facial features, no masses or lesions    Right ear examination: External ear normal.  EAC is clear.  TM intact with no effusion, retraction or perforation.  Left ear examination: External ear normal.  EAC is clear.  TM intact with no effusion, retraction or perforation.    Magnet site right CI appears intact with no skin breakdown no swelling on the scalp.    NOSE: External nose midline  ORAL CAVITY: No lesions of external lips; uvula is midline; tongue with good mobility; no gross mass in oral cavity; mucosa appears pink   NECK/LYMPH: No obvious deformity or lesions; trachea is midline  PSYCH: Alert and oriented with appropriate mood and affect        Assessment/Plan      1. Asymmetrical sensorineural hearing loss        2. Cochlear implant in place          In summary, patient has a right-sided asymmetrical sensorineural hearing loss status post  surgery by Dr. Preston on 7/17/2024.    Postop course is unremarkable.  Patient doing well with current program.  He continues to do auditory training techniques at home.  He is happy with his cochlear implant no complaints of tinnitus dizziness or vertigo.    Proceed with follow-up with audiology in 3 to 4 months.  Follow-up with ENT in 6 months.         HERMILA Celeste-CNP 01/23/25 7:32 AM

## 2025-01-23 NOTE — PROGRESS NOTES
"Subjective   Patient ID: Vaibhav Caban is a 39 y.o. male who presents for  CI IMPLANT Follow-up.  HPI  Patient with history of sudden sensorineural hearing loss in May 2023 in the right ear.  He underwent 3 intratympanic steroid injection with very minimal improvement.  He had significant tinnitus in the right ear as well as bouts of dizziness.  After hearing test CI was discussed and he underwent right cochlear implant procedure by Dr. Preston on 7/17/2024.    Postop course unremarkable.    Patient currently has  cochlear implant used in conjunction with CP 1150 speech processor.       Patient has no complaints of ear pain or magnet site pain.  He is still adjusting to the current mapping.  He states that he can hear so much better with the cochlear implant.  No complaints of tinnitus, dizziness or vertigo.    He uses his CI all day averages about 16 hours/day of use.  He is actively utilizing auditory training techniques.    He has a follow-up with audiology for mapping in February-March 2025.                    PER DR. PRESTON NOTE 7/26/24  Date of surgery: 7/17/2024      CC: Right Cochlear Implant Post-op visit     SUBJECTIVE:  Patient underwent a Right cochlear implant procedure.  No complaints and specifically denies vertigo, dizziness, otorrhea, or otalgia.     ROS:  He denies constitutional symptoms of fatigue, weakness, weight loss or gain, fevers, night sweats.      OBJECTIVE:     The patient is well-developed, well-nourished in no acute distress with a good ability to communicate without hearing aids.  He has normal facial strength and symmetry and is alert and oriented to time, person, and place with appropriate mood and affect.      Vitals       Vitals:     07/26/24 1614   BP: 113/72   Pulse: 73   Resp: 16   Temp: 37 °C (98.6 °F)   TempSrc: Temporal   SpO2: 99%   Weight: 66.9 kg (147 lb 8 oz)   Height: 1.702 m (5' 7\")            Incision healing well.  He does have hemoptympanum.  The tympanic " membrane is intact.     Operative findings:   Implant Brand: Cochlear Brand 622 electrode   Round Window Angle: Favorable  Cochleostomy: No  Mastoid: Poorly pneumatized  Ossicular chain status: Intact  Chorda tympani nerve status: Intact  Facial nerve: Intact  Insertion Time: 120 seconds  Resistance: None  Insertion: Smooth  Hearing Preservation Approach: yes  Healon: No  Intraoperative Topical Steroids: yes  SSD Indication: yes  Subperiosteal Pocket: yes  Anchoring Sutures: None  Post-Op Testing: NRT, Impedence, ECoG present after insertion, SmartNav adequate  Intraoperative Imaging: no -      ASSESSMENT and PLAN:  Right sensorineural hearing loss s/p cochlear implantation. Doing well.  Operative findings were discussed with the patient.  -- Cleared for activation  -- Follow-up in 6 months      Remigio Preston MD            Electronically signed by Remigio Preston MD at 7/26/2024  6:41 PM      Objective   Physical Exam  CONSTITUTIONAL: No acute distress, normal facial features; No fever; no chills  VOICE: No hoarseness or other audible abnormality  RESPIRATION: Breathing comfortably, no stridor; normal breathing effort  CV: No cyanosis visible on the face and neck area  EYES:Pupils equal and round ; no erythema; conjunctiva clear; sclera white  NEURO: Alert and oriented, able to raise eyebrows symmetrical bilateral, smile with no facial droop, able to swallow  HEAD AND FACE: Symmetric facial features, no masses or lesions    Right ear examination: External ear normal.  EAC is clear.  TM intact with no effusion, retraction or perforation.  Left ear examination: External ear normal.  EAC is clear.  TM intact with no effusion, retraction or perforation.    Magnet site right CI appears intact with no skin breakdown no swelling on the scalp.    NOSE: External nose midline  ORAL CAVITY: No lesions of external lips; uvula is midline; tongue with good mobility; no gross mass in oral cavity; mucosa appears pink    NECK/LYMPH: No obvious deformity or lesions; trachea is midline  PSYCH: Alert and oriented with appropriate mood and affect        Assessment/Plan       1. Asymmetrical sensorineural hearing loss        2. Cochlear implant in place          In summary, patient has a right-sided asymmetrical sensorineural hearing loss status post  surgery by Dr. Preston on 7/17/2024.    Postop course is unremarkable.  Patient doing well with current program.  He continues to do auditory training techniques at home.  He is happy with his cochlear implant no complaints of tinnitus dizziness or vertigo.    Proceed with follow-up with audiology in 3 to 4 months.  Follow-up with ENT in 6 months.         HERMILA Celeste-CNP 01/23/25 7:32 AM

## 2025-01-23 NOTE — PATIENT INSTRUCTIONS
Magnet site intact no skin breakdown.  Continue to do auditory training at home.  Follow-up with audiology for programming in 3 months.  Follow-up with ENT in 6 months.

## 2025-02-19 ENCOUNTER — CLINICAL SUPPORT (OUTPATIENT)
Dept: AUDIOLOGY | Facility: CLINIC | Age: 40
End: 2025-02-19
Payer: COMMERCIAL

## 2025-02-19 DIAGNOSIS — H90.41 SENSORINEURAL HEARING LOSS (SNHL) OF RIGHT EAR WITH UNRESTRICTED HEARING OF LEFT EAR: Primary | ICD-10-CM

## 2025-02-19 PROCEDURE — 92626 EVAL AUD FUNCJ 1ST HOUR: CPT | Mod: 59 | Performed by: AUDIOLOGIST

## 2025-02-19 PROCEDURE — 92604 REPROGRAM COCHLEAR IMPLT 7/>: CPT | Performed by: AUDIOLOGIST

## 2025-02-19 NOTE — PROGRESS NOTES
Name: Vaibhav Caban  YOB: 1985  Age: 39 y.o.    Date of Evaluation:  11/15/2024    Vaibhav Caban, 39 y.o. , was seen for the repeat aided testing and stimulation of the  cochlear implant to be used in conjunction with the [id number] speech processor. Vaibhav was implanted by Dr. Preston on 7/17/2024. Post-operative course remains unremarkable.    Recall:  He had a sudden sensorineural hearing loss in the right ear in May 2023. He was seen by an outside ENT in June 2023 and put on a steroid pack and intra-tympanic membrane injections. His hearing did not resolve.      Hearing test from 6/14/2023 indicates a moderately-severe sensorineural hearing loss 250-2000 Hz rising to mild to normal hearing through 8000 Hz. Of note, tympanograms, acoustic reflexes, and distortion-product otoacoustic emissions were not completed at this time.    Programming completed on 11/15/2025:  The [id number] processor and remote control were dispensed with all associated equipment and accessories.  The processor was programmed in an MP1+2 stimulation mode using an ACE speech processing strategy, 8 maxima and a 900 Hz stimulation rate.  Electrodes 1-22 were activated.  Using a population mean technique, a map was created with good reliability.  C-levels were swept in bands of 3 for comfortable loudness. T-levels were decreased globally to reduce echo sensation. No facial nerve stimulation was observed.      A second map was created programmed in a MP1+2 stimulation mode using an ACE speech processing strategy, 8 maxima and a 900 Hz stimulation rate.  Electrodes 1-22 were activated.  Using intra-operative NRT data, a map was created with good reliability.  C-levels were swept in bands of 3 for comfortable loudness. T-levels were decreased globally to reduce echo sensation. No facial nerve stimulation was observed.      Vaibhav was counseled to try both maps and determine which map provides better benefit. Follow-up in 6-12  months.    Today, Vaibhav reported he preferred the population mean map from his first program.    Programming:  Aided testing was completed in the sound field while Vaibhav wore the [employer] 2 speech processor on the right ear. Left ear was plugged (insert) and muffed (supraural). Responses to warble tone stimuli were recorded in the mild range from 20-30 dB HL from 250-6000 Hz. Word and Sentence understanding were completed. See below     CNC at 50 dB HL Az-Bio  at 50 dB HL Az-Bio at 50 dB HL with 40 dB HL speech babble   Pre-Op  Scores 40 % 56 % 55 %   Post-Op  Scores  11/15/2025 44 % 56 % 55 %   Post-Op Scores 2/19/2025 56 % 78 % DNT         Impedances were evaluated using the ear level [id number] speech processor for intra-cochlear electrodes 1-22 in common ground (CG), Monopolar 1 (MP1), Monopolar 2 (MP2) and MP1+2 stimulation modes.  Satisfactory impedances were found for all electrodes, in each stimulation mode.      Data logging indicates 16+hours of daily use.    Visual inspection of the surgical site reveals reduced swelling around the magnet site. 5i magnet appropriate while connected with a hair clip for better retention.     The [id number] processor and remote control were dispensed with all associated equipment and accessories.  The processor was programmed in an MP1+2 stimulation mode using an ACE speech processing strategy, 8 maxima and a 900 Hz stimulation rate.  Electrodes 1-22 were activated.  Using a population mean technique, a map was created with good reliability.  C-levels were swept in bands of 3 for comfortable loudness. Minimal changes were made to programming today. No facial nerve stimulation was observed.      Treatment Plan:  1. Utilize the  Cochlear Implant System and the N8  speech processor daily   2. Return for remapping and optimization of the speech processor as scheduled in approximately 6-9 months in order to optimize the psychophysical ACE map.  3. Utilize aural rehabilitation/auditory  training techniques at home to improve understanding ability. Recommend establish care with auditory verbal therapy resources through [facility]   4. ENT follow up annually    Time: 7050-6220    Completed by:  Slim Schafer, CCC-A  Licensed Senior Audiologist

## 2025-07-23 ENCOUNTER — APPOINTMENT (OUTPATIENT)
Dept: OTOLARYNGOLOGY | Facility: CLINIC | Age: 40
End: 2025-07-23
Payer: COMMERCIAL

## 2025-07-23 VITALS — BODY MASS INDEX: 25.11 KG/M2 | WEIGHT: 160 LBS | HEIGHT: 67 IN

## 2025-07-23 DIAGNOSIS — Z96.21 COCHLEAR IMPLANT IN PLACE: ICD-10-CM

## 2025-07-23 DIAGNOSIS — H93.11 TINNITUS OF RIGHT EAR: ICD-10-CM

## 2025-07-23 DIAGNOSIS — H90.41 SENSORINEURAL HEARING LOSS (SNHL) OF RIGHT EAR WITH UNRESTRICTED HEARING OF LEFT EAR: Primary | ICD-10-CM

## 2025-07-23 PROCEDURE — 3008F BODY MASS INDEX DOCD: CPT | Performed by: OTOLARYNGOLOGY

## 2025-07-23 PROCEDURE — 99214 OFFICE O/P EST MOD 30 MIN: CPT | Performed by: OTOLARYNGOLOGY

## 2025-07-23 NOTE — LETTER
July 28, 2025     ROSA Brunner, CCC-A  [id number] [facility]  Audiology Services  [facility] [address]    Patient: Vaibhav Caban   YOB: 1985   Date of Visit: 7/23/2025       Dear Dr. Yamel Perea, ROSA, CCC-A:    Thank you for referring Vaibhav Caban to me for evaluation. Below are my notes for this consultation.  If you have questions, please do not hesitate to call me. I look forward to following your patient along with you.       Sincerely,     Florian Wang MD      CC: Raymond Coates, HERMILA-CNP  ______________________________________________________________________________________            Reason for Consult:  S/p CI     Subjective  History Of Present Illness:  Vaibhav Caban is a 40 y.o. male with a sudden hearing loss in the right in 05/2024. He underwent IT dex x3 without benefit. Sig tinnitus and dizziness. The dizziness subsided and received a right-sided CI in 07/2024 by Dr. Preston with a  electrode.    Since the procedure, he has been doing well. No new problems. He uses the implants regularly. He sees Codie Denton for programing. His hearing in noise has improved.      Past Medical History:  He has a past medical history of Disease of thyroid gland (On March 2020), HL (hearing loss) (May 2023), and Tinnitus (May 2023).    Surgical History:  He has a past surgical history that includes Foot surgery; Bunionectomy; Colonoscopy; and Cochlear implant (7/17/2024).     Social History:  He reports that he has never smoked. He has been exposed to tobacco smoke. He has never used smokeless tobacco. He reports that he does not currently use alcohol. He reports that he does not use drugs.    Family History:  Family history is unknown by patient.     Medications:  Current Outpatient Medications   Medication Instructions   • cholecalciferol (VITAMIN D-3) 2,000 Units, Daily   • methIMAzole (TAPAZOLE) 10 mg   • omeprazole (PriLOSEC) 20 mg DR capsule TAKE ONE CAPSULE EVERY  "MORNING BEFORE BREAKFAST WHILE TAKING STEROID TAPER      Allergies:  Patient has no known allergies.    Review of Systems:   A comprehensive 10-point review of systems was obtained including constitutional, neurological, HEENT, pulmonary, cardiovascular, genito-urinary, and other pertinent systems and was negative except as noted in the HPI.     Objective  Physical Exam:  Last Recorded Vitals: Height 1.702 m (5' 7\"), weight 72.6 kg (160 lb).    On physical exam, the patient is a well-nourished, well-developed patient, in no acute distress, able to communicate without assistance in English language. Head and face is atraumatic and normocephalic. Salivary glands are intact. Facial strength is symmetrical bilaterally.       On ear examination:  Right ear: The patient has an open and patent ear canal. The tympanic membrane is intact.  The magnet is in good position AC >BC  Left ear: The patient has an open and patent ear canal. The tympanic membrane is intact.  AC>BC  The Alvarado is left    On vestibular exam, the patient has no spontaneous nystagmus, no headshake nystagmus, no head-thrust nystagmus, and no nystagmus on hyperventilation or Valsalva maneuvers. Eugene-Hallpike maneuver is negative bilaterally.       On neuro exam, the patient is alert and oriented x3, cranial nerves are grossly intact, cerebellar exam is normal.      The rest of the exam, including anterior rhinoscopy, oropharyngeal exam, neck exam, and cardiovascular exam, were normal including no palpable lymphadenopathies, thyroid in the midline position, normal pulses, and normal chest excursion.       Reviewed Results:  Audiology Testing:  Cochlear Implant Postop Programming:  I reviewed the postop CI programming session from 02/2025 that showed:  Right:          CNC at 50 dB HL Az-Bio  at 50 dB HL Az-Bio at 50 dB HL with 40 dB HL speech babble   Pre-Op  Scores 40 % 56 % 55 %   Post-Op  Scores  11/15/2025 44 % 56 % 55 %   Post-Op Scores 2/19/2025 56 % 78 " % DNT                               Imaging:  None     Procedure:  None    Assessment/Plan    1. Sensorineural hearing loss (SNHL) of right ear with unrestricted hearing of left ear    2. Tinnitus of right ear    3. Cochlear implant in place        In summary, Vaibhav Caban is a 40 y.o. male with a sudden SNHL on the right in 2023 with subsequent severe HL and tinnitus. He is s/p cochlear implantation with a  electrode in 07/2024 by Dr. Preston.    From a hearing perspective he is doing well. His speech understanding has improve. He is up to 70% in AzBio.    He will continue following up with his audiologist, Codie Denton and follow up with me as needed.       Scribe Attestation  By signing my name below, I, Trinity Rincon , Scribe attest that this documentation has been prepared under the direction and in the presence of Florian Wang MD.    ____________________________________________________  Florian Alonzo MD  Professor and Chief   Otology/Neurotology/Lateral Skull-Base Surgery   [facility]

## 2025-07-23 NOTE — PROGRESS NOTES
"        Reason for Consult:  S/p CI     Subjective   History Of Present Illness:  Vaibhav Caban is a 40 y.o. male with a sudden hearing loss in the right in 05/2024. He underwent IT dex x3 without benefit. Sig tinnitus and dizziness. The dizziness subsided and received a right-sided CI in 07/2024 by Dr. Preston with a  electrode.    Since the procedure, he has been doing well. No new problems. He uses the implants regularly. He sees Codie Denton for programing. His hearing in noise has improved.      Past Medical History:  He has a past medical history of Disease of thyroid gland (On March 2020), HL (hearing loss) (May 2023), and Tinnitus (May 2023).    Surgical History:  He has a past surgical history that includes Foot surgery; Bunionectomy; Colonoscopy; and Cochlear implant (7/17/2024).     Social History:  He reports that he has never smoked. He has been exposed to tobacco smoke. He has never used smokeless tobacco. He reports that he does not currently use alcohol. He reports that he does not use drugs.    Family History:  Family history is unknown by patient.     Medications:  Current Outpatient Medications   Medication Instructions    cholecalciferol (VITAMIN D-3) 2,000 Units, Daily    methIMAzole (TAPAZOLE) 10 mg    omeprazole (PriLOSEC) 20 mg DR capsule TAKE ONE CAPSULE EVERY MORNING BEFORE BREAKFAST WHILE TAKING STEROID TAPER      Allergies:  Patient has no known allergies.    Review of Systems:   A comprehensive 10-point review of systems was obtained including constitutional, neurological, HEENT, pulmonary, cardiovascular, genito-urinary, and other pertinent systems and was negative except as noted in the HPI.     Objective   Physical Exam:  Last Recorded Vitals: Height 1.702 m (5' 7\"), weight 72.6 kg (160 lb).    On physical exam, the patient is a well-nourished, well-developed patient, in no acute distress, able to communicate without assistance in English language. Head and face is atraumatic " and normocephalic. Salivary glands are intact. Facial strength is symmetrical bilaterally.       On ear examination:  Right ear: The patient has an open and patent ear canal. The tympanic membrane is intact.  The magnet is in good position AC >BC  Left ear: The patient has an open and patent ear canal. The tympanic membrane is intact.  AC>BC  The Alvarado is left    On vestibular exam, the patient has no spontaneous nystagmus, no headshake nystagmus, no head-thrust nystagmus, and no nystagmus on hyperventilation or Valsalva maneuvers. Josiane-Hallpike maneuver is negative bilaterally.       On neuro exam, the patient is alert and oriented x3, cranial nerves are grossly intact, cerebellar exam is normal.      The rest of the exam, including anterior rhinoscopy, oropharyngeal exam, neck exam, and cardiovascular exam, were normal including no palpable lymphadenopathies, thyroid in the midline position, normal pulses, and normal chest excursion.       Reviewed Results:  Audiology Testing:  Cochlear Implant Postop Programming:  I reviewed the postop CI programming session from 02/2025 that showed:  Right:          CNC at 50 dB HL Az-Bio  at 50 dB HL Az-Bio at 50 dB HL with 40 dB HL speech babble   Pre-Op  Scores 40 % 56 % 55 %   Post-Op  Scores  11/15/2025 44 % 56 % 55 %   Post-Op Scores 2/19/2025 56 % 78 % DNT                               Imaging:  None     Procedure:  None    Assessment/Plan     1. Sensorineural hearing loss (SNHL) of right ear with unrestricted hearing of left ear    2. Tinnitus of right ear    3. Cochlear implant in place        In summary, Vaibhav Caban is a 40 y.o. male with a sudden SNHL on the right in 2023 with subsequent severe HL and tinnitus. He is s/p cochlear implantation with a  electrode in 07/2024 by Dr. Preston.    From a hearing perspective he is doing well. His speech understanding has improve. He is up to 70% in AzBio.    He will continue following up with his audiologist, Codie  Snauffer and follow up with me as needed.       Scribe Attestation  By signing my name below, I, Trinity Sergey , Batsheva attest that this documentation has been prepared under the direction and in the presence of Florian Wang MD.    ____________________________________________________  Florian Alonzo MD  Professor and Chief   Otology/Neurotology/Lateral Skull-Base Surgery   [facility]

## 2025-08-08 ENCOUNTER — APPOINTMENT (OUTPATIENT)
Dept: AUDIOLOGY | Facility: CLINIC | Age: 40
End: 2025-08-08
Payer: COMMERCIAL

## 2025-08-22 ENCOUNTER — CLINICAL SUPPORT (OUTPATIENT)
Dept: AUDIOLOGY | Facility: CLINIC | Age: 40
End: 2025-08-22
Payer: COMMERCIAL

## 2025-08-22 DIAGNOSIS — H90.41 SENSORINEURAL HEARING LOSS (SNHL) OF RIGHT EAR WITH UNRESTRICTED HEARING OF LEFT EAR: Primary | ICD-10-CM

## 2025-08-22 PROCEDURE — 92557 COMPREHENSIVE HEARING TEST: CPT | Mod: LT | Performed by: AUDIOLOGIST

## 2025-08-22 PROCEDURE — 92604 REPROGRAM COCHLEAR IMPLT 7/>: CPT | Performed by: AUDIOLOGIST

## 2025-08-22 PROCEDURE — 92567 TYMPANOMETRY: CPT | Performed by: AUDIOLOGIST

## 2025-08-22 PROCEDURE — 92626 EVAL AUD FUNCJ 1ST HOUR: CPT | Mod: 59 | Performed by: AUDIOLOGIST

## (undated) DEVICE — BALL, DIAMOND CUTTER 1.5MM

## (undated) DEVICE — WAX, BONE, 2.5 GM

## (undated) DEVICE — TOWEL, SURGICAL, NEURO, O/R, 16 X 26, BLUE, STERILE

## (undated) DEVICE — TUBING, SUCTION, OTOMED

## (undated) DEVICE — DRAPE, SMARTDRAPE, FOR TIVATO MICROSCOPE

## (undated) DEVICE — COMB, HAIR, 7 IN, PLASTIC, BLACK

## (undated) DEVICE — DRAPE, SURGICAL, OTOLOGY GLASSCOCK

## (undated) DEVICE — DRESSING, EAR, GLASSCOCK, ADULT

## (undated) DEVICE — NEEDLE, HYPODERMIC, MONOJECT, TRI-BEVELED, ANTI-CORING, 25 G X 1.25 IN, LUER LOCK HUB, RED

## (undated) DEVICE — BALL, DIAMOND, 2 MM

## (undated) DEVICE — Device

## (undated) DEVICE — SUTURE, SILK, 2-0, 30 IN, SH, BLACK

## (undated) DEVICE — DRAPE, INSTRUMENT, W/POUCH, STERI DRAPE, 9 5/8 X 18 LONG

## (undated) DEVICE — STOCKINETTE, IMPERVIOUS, 12 X 48 IN, LF, STERILE

## (undated) DEVICE — GLOVE, SURGICAL, PROTEXIS PI , 7.0, PF, LF

## (undated) DEVICE — NEEDLE, HYPODERMIC, MONOJECT, TRI-BEVELED, ANTI-CORING, 27 G X 1.25 IN, LUER LOCK HUB, YELLOW

## (undated) DEVICE — SYRINGE, 1 CC, LUER LOCK

## (undated) DEVICE — STRIP, SKIN CLOSURE, STERI STRIP, REINFORCED, 0.5 X 4 IN

## (undated) DEVICE — DRESSING, MOISTURE VAPOR PERMEABLE, POLYSKIN II, 2 X 2.75 IN, TRANSPARENT

## (undated) DEVICE — TAPE, SILK, DURAPORE, 3 IN X 10 YD, LF

## (undated) DEVICE — SUTURE, VICRYL, 3-0,18 IN, SH, UNDYED

## (undated) DEVICE — BURR, MICROMAX BALL FLUTED 5MM

## (undated) DEVICE — BALL, DIAMOND COARSE 3MM STANDARD

## (undated) DEVICE — CLEANER, WIPE, INSTRUMENT, 3.25 X 3.25 IN

## (undated) DEVICE — SUTURE, MONOCRYL, 4-0, 18 IN, PS2, UNDYED

## (undated) DEVICE — 12MM MEDTRONIC PAIRED SUBDERMAL ELECTRODE, 2-CHANNEL, 12.0MM***DUPLICATE, PLEASE TRANSITION TO CAT #8227410

## (undated) DEVICE — ELECTRODE, PAIRED SUBDERMAL OTO

## (undated) DEVICE — COVER PROBE, SOFT FLEX W/ GEL, 5 X 48 IN (13X122CM)

## (undated) DEVICE — SYRINGE, MONOJECT, LUER LOCK, 3 CC, LF

## (undated) DEVICE — BAND, RUBBER, 3 IN, STERILE

## (undated) DEVICE — TIP, SUCTION, FRAZIER, 8 FR